# Patient Record
Sex: MALE | Race: WHITE | NOT HISPANIC OR LATINO | Employment: FULL TIME | URBAN - NONMETROPOLITAN AREA
[De-identification: names, ages, dates, MRNs, and addresses within clinical notes are randomized per-mention and may not be internally consistent; named-entity substitution may affect disease eponyms.]

---

## 2018-09-20 ENCOUNTER — OFFICE VISIT (OUTPATIENT)
Dept: UROLOGY | Facility: CLINIC | Age: 53
End: 2018-09-20

## 2018-09-20 VITALS
WEIGHT: 230 LBS | DIASTOLIC BLOOD PRESSURE: 85 MMHG | SYSTOLIC BLOOD PRESSURE: 155 MMHG | BODY MASS INDEX: 31.15 KG/M2 | HEIGHT: 72 IN | TEMPERATURE: 96.7 F

## 2018-09-20 DIAGNOSIS — R97.20 ELEVATED PSA: Primary | ICD-10-CM

## 2018-09-20 LAB
BILIRUB BLD-MCNC: NEGATIVE MG/DL
CLARITY, POC: CLEAR
COLOR UR: YELLOW
GLUCOSE UR STRIP-MCNC: NEGATIVE MG/DL
KETONES UR QL: NEGATIVE
LEUKOCYTE EST, POC: NEGATIVE
NITRITE UR-MCNC: NEGATIVE MG/ML
PH UR: 6 [PH] (ref 5–8)
PROT UR STRIP-MCNC: NEGATIVE MG/DL
RBC # UR STRIP: ABNORMAL /UL
SP GR UR: 1.02 (ref 1–1.03)
UROBILINOGEN UR QL: NORMAL

## 2018-09-20 PROCEDURE — 99204 OFFICE O/P NEW MOD 45 MIN: CPT | Performed by: UROLOGY

## 2018-09-20 PROCEDURE — 81001 URINALYSIS AUTO W/SCOPE: CPT | Performed by: UROLOGY

## 2018-09-20 RX ORDER — CIPROFLOXACIN 500 MG/1
500 TABLET, FILM COATED ORAL 2 TIMES DAILY
Qty: 6 TABLET | Refills: 3 | Status: SHIPPED | OUTPATIENT
Start: 2018-09-20 | End: 2018-10-24

## 2018-09-20 RX ORDER — METOPROLOL TARTRATE 50 MG/1
TABLET, FILM COATED ORAL
Refills: 11 | Status: ON HOLD | COMMUNITY
Start: 2018-09-03 | End: 2018-11-14

## 2018-09-20 NOTE — PROGRESS NOTES
Mr. Hendrix is 53 y.o. male    Chief Complaint   Patient presents with   • Elevated PSA       History of Present Illness  Elevated PSA  Patient is here with an elevated PSA. He has no personal history of prostate cancer. His AUA Symptom Score is 0/35,  . He has no prior genitourinary history of previous  surgery.  Previous PSA values are :   19.7   He reports no urinary symptoms. Patient states symptoms are of no severity. Onset of PSA elevation was February of this year, and has been persistent     The following portions of the patient's history were reviewed and updated as appropriate: allergies, current medications, past family history, past medical history, past social history, past surgical history and problem list.    Review of Systems   Constitutional: Negative for appetite change, chills, fever and unexpected weight change.   HENT: Negative for congestion, ear pain, facial swelling, hearing loss, nosebleeds, trouble swallowing and voice change.    Eyes: Negative for photophobia, pain, discharge and visual disturbance.   Respiratory: Negative for cough, choking, chest tightness and shortness of breath.    Cardiovascular: Negative for chest pain and palpitations.   Gastrointestinal: Negative for abdominal distention, abdominal pain, blood in stool, constipation, diarrhea, nausea and vomiting.   Endocrine: Negative for cold intolerance, heat intolerance and polydipsia.   Genitourinary: Negative for decreased urine volume, difficulty urinating, discharge, dysuria, enuresis, flank pain, frequency, genital sores, hematuria, penile pain, penile swelling, scrotal swelling, testicular pain and urgency.   Musculoskeletal: Negative for arthralgias, joint swelling, neck pain and neck stiffness.   Skin: Negative for pallor and rash.   Allergic/Immunologic: Negative for immunocompromised state.   Neurological: Negative for dizziness, tremors, seizures, syncope, light-headedness and headaches.   Hematological: Negative for  "adenopathy. Does not bruise/bleed easily.   Psychiatric/Behavioral: Negative for agitation, confusion, dysphoric mood, hallucinations, self-injury and suicidal ideas.         Current Outpatient Prescriptions:   •  metoprolol tartrate (LOPRESSOR) 50 MG tablet, TK 1 T PO BID, Disp: , Rfl: 11  •  ciprofloxacin (CIPRO) 500 MG tablet, Take 1 tablet by mouth 2 (Two) Times a Day. Start the day prior to biopsy, Disp: 6 tablet, Rfl: 3    Past Medical History:   Diagnosis Date   • Hypertension        Past Surgical History:   Procedure Laterality Date   • HAND SURGERY     • KNEE SURGERY     • SHOULDER SURGERY         Social History     Social History   • Marital status: Unknown     Social History Main Topics   • Smoking status: Never Smoker   • Smokeless tobacco: Current User     Types: Snuff   • Alcohol use No   • Drug use: No   • Sexual activity: Yes     Other Topics Concern   • Not on file       Family History   Problem Relation Age of Onset   • No Known Problems Father    • No Known Problems Mother        Objective    /85   Temp 96.7 °F (35.9 °C)   Ht 182.9 cm (72\")   Wt 104 kg (230 lb)   BMI 31.19 kg/m²     Physical Exam  Constitutional: Well nourished, Well developed; No apparent distress.  His vital signs are reviewed  Psychiatric: Appropriate affect; Alert and oriented  Eyes: Unremarkable  Musculoskeletal: Normal gait and station  GI: Abdomen is soft, non-tender  Respiratory: No distress; Unlabored movement; No accessory musculature needed with symmetric movements  Skin: No pallor or diaphoresis  ; Penis and testicles are normal; unable to feel his entire prostate due to body habitus.  However the apex of prostate does feel firm        No results found for any previous visit.       Results for orders placed or performed in visit on 09/20/18   POC Urinalysis Dipstick, Multipro   Result Value Ref Range    Color Yellow Yellow, Straw, Dark Yellow, Nikki    Clarity, UA Clear Clear    Glucose, UA Negative " Negative, 1000 mg/dL (3+) mg/dL    Bilirubin Negative Negative    Ketones, UA Negative Negative    Specific Gravity  1.025 1.005 - 1.030    Blood, UA Trace (A) Negative    pH, Urine 6.0 5.0 - 8.0    Protein, POC Negative Negative mg/dL    Urobilinogen, UA Normal Normal    Nitrite, UA Negative Negative    Leukocytes Negative Negative     Assessment and Plan    Bull was seen today for elevated psa.    Diagnoses and all orders for this visit:    Elevated PSA  -     POC Urinalysis Dipstick, Multipro  -     Biopsy Prostate  -     ciprofloxacin (CIPRO) 500 MG tablet; Take 1 tablet by mouth 2 (Two) Times a Day. Start the day prior to biopsy    I reviewed his outside records.  In summary, this is a 53-year-old gentleman with a PSA elevation.  His PSA in February of this year was 17.  He was repeated earlier this month and found the 19.7.    I did discuss with him that this is a gross elevation of his PSA, especially at a young age.  My biggest concern here is possible prostate cancer.  Options would include repeating a PSA versus a prostate biopsy and he has chosen to proceed with a prostate biopsy.  He understands the risk and benefits as outlined below.    I discussed elevated PSA with him today. We discussed that PSA is a protein measured in the bloodstream that comes exclusively from the prostate gland I mentioned to him that all men with a prostate gland will have a certain PSA level. We discussed that this number can be compared to all men and age-specific PSA as well as PSA velocity. We discussed that Prostate Cancer is a possible cause of PSA elevaton, but benign etiologies such as infection, enlargement, aging, and inflammation should also be considered. We discussed that some patients with a normal PSA may also have prostate cancer. The necessity of digital rectal examination is also discussed. The role of free to total PSA Ratio is explained. The risks (infection, bleeding, pain, retention, sepsis) and possible  benefits of transrectal ultrasound with biopsy of the prostate gland is also discussed. It is explained that some patients require a repeat biopsy based on diagnosis of prostate intraepithelial neoplasia or even a continued rising PSA after an initial biopsy. He voiced no additional questions.

## 2018-09-28 ENCOUNTER — PROCEDURE VISIT (OUTPATIENT)
Dept: UROLOGY | Facility: CLINIC | Age: 53
End: 2018-09-28

## 2018-09-28 DIAGNOSIS — R97.20 ELEVATED PSA: Primary | ICD-10-CM

## 2018-09-28 LAB
BILIRUB BLD-MCNC: NEGATIVE MG/DL
CLARITY, POC: CLEAR
COLOR UR: YELLOW
GLUCOSE UR STRIP-MCNC: NEGATIVE MG/DL
KETONES UR QL: NEGATIVE
LEUKOCYTE EST, POC: NEGATIVE
NITRITE UR-MCNC: NEGATIVE MG/ML
PH UR: 6 [PH] (ref 5–8)
PROT UR STRIP-MCNC: NEGATIVE MG/DL
RBC # UR STRIP: ABNORMAL /UL
SP GR UR: 1 (ref 1–1.03)
UROBILINOGEN UR QL: NORMAL

## 2018-09-28 PROCEDURE — 76942 ECHO GUIDE FOR BIOPSY: CPT | Performed by: UROLOGY

## 2018-09-28 PROCEDURE — 76872 US TRANSRECTAL: CPT | Performed by: UROLOGY

## 2018-09-28 PROCEDURE — 55700 PR PROSTATE NEEDLE BIOPSY ANY APPROACH: CPT | Performed by: UROLOGY

## 2018-09-28 PROCEDURE — G0416 PROSTATE BIOPSY, ANY MTHD: HCPCS | Performed by: UROLOGY

## 2018-09-28 PROCEDURE — 81003 URINALYSIS AUTO W/O SCOPE: CPT | Performed by: UROLOGY

## 2018-09-28 NOTE — PROGRESS NOTES
Indications:  Elevated PSA    Pre-operative prep:  fleets enema, oral antibiotic and stopped aspirin, coumadin, and other anticoagulants    Last PSA:  19.7    Procedure:    After proper identification of patient and procedure, patient was placed in the left later decubitus position.  2% lidocaine jelly was instilled per rectum  for topical anesthesia.  The ultrasound probe was gently inserted per rectum. Prostate was scanned from the base of the bladder to the apex.  20 cc of 1% lidocaine plain was then used to perform a prostate nerve block injecting the junction of the seminal vesicle and bladder laterally.      Prostate length: 4.72 cm    Prostate width: 4.58 cm    Prostate height: 2.80 cm    Prostate volume: 31.6 cc    Abnormal findings:  Hypoechoic lesions  and Hyperechoic lesions     Median lobe:  no    A total of 12 biopsies were taken from the base, apex, and mid portion of the gland on both the right and the left sides.     Patient tolerated the procedure well    Complications: none    Estimated blood loss: minimal    Mr. Hendrix was given instructions for follow up.  He will notify the office if he has excessive hematuria, hematochezia, fevers, perineal, or abdominal pain.    Follow up:   He will follow up in 2 weeks for pathology results.

## 2018-10-01 LAB
CYTO UR: NORMAL
LAB AP CASE REPORT: NORMAL
LAB AP INTRADEPARTMENTAL CONSULT: NORMAL
LAB AP SYNOPTIC CHECKLIST: NORMAL
PATH REPORT.FINAL DX SPEC: NORMAL
PATH REPORT.GROSS SPEC: NORMAL

## 2018-10-03 DIAGNOSIS — C61 PROSTATE CANCER (HCC): Primary | ICD-10-CM

## 2018-10-05 ENCOUNTER — TELEPHONE (OUTPATIENT)
Dept: UROLOGY | Facility: CLINIC | Age: 53
End: 2018-10-05

## 2018-10-05 NOTE — TELEPHONE ENCOUNTER
----- Message from Marlene Wood sent at 10/5/2018  2:53 PM CDT -----  Regarding: RE: bone and ct scan  Tried to call pt to transfer him to the [facility]. I left vm with [facility] telephone number and instructions, also to call back if he is unable to call them.  ----- Message -----  From: Elvi Andersen  Sent: 10/5/2018   2:20 PM  To: Marlene Wood  Subject: FW: bone and ct scan                             Please contact patient and transfer him to imaging scheduling. Thank you.  ----- Message -----  From: Stewart Dawkins  Sent: 10/5/2018   2:10 PM  To: Elvi Andersen  Subject: bone and ct scan                                 His ct and bone scan still arnt schd. Cancer consult on Tuesday. They need to be schd on Monday

## 2018-10-08 NOTE — TELEPHONE ENCOUNTER
Called and left message for patient regarding time for CT and Bone Scan tomorrow. Asked him to call back to confirm.

## 2018-10-09 ENCOUNTER — HOSPITAL ENCOUNTER (OUTPATIENT)
Dept: NUCLEAR MEDICINE | Facility: HOSPITAL | Age: 53
Discharge: HOME | End: 2018-10-09
Attending: UROLOGY

## 2018-10-09 ENCOUNTER — HOSPITAL ENCOUNTER (OUTPATIENT)
Dept: CT IMAGING | Facility: HOSPITAL | Age: 53
Discharge: HOME | End: 2018-10-09
Attending: UROLOGY | Admitting: UROLOGY

## 2018-10-09 DIAGNOSIS — C61 PROSTATE CANCER (HCC): ICD-10-CM

## 2018-10-09 LAB — CREAT BLDA-MCNC: 1.2 MG/DL (ref 0.6–1.3)

## 2018-10-09 PROCEDURE — 25010000002 IOPAMIDOL 61 % SOLUTION: Performed by: UROLOGY

## 2018-10-09 PROCEDURE — 0 TECHNETIUM OXIDRONATE KIT: Performed by: UROLOGY

## 2018-10-09 PROCEDURE — A9561 TC99M OXIDRONATE: HCPCS | Performed by: UROLOGY

## 2018-10-09 PROCEDURE — 78306 BONE IMAGING WHOLE BODY: CPT

## 2018-10-09 PROCEDURE — 74177 CT ABD & PELVIS W/CONTRAST: CPT

## 2018-10-09 PROCEDURE — 82565 ASSAY OF CREATININE: CPT

## 2018-10-09 RX ADMIN — TECHNETIUM TC 99M OXIDRONATE 1 DOSE: 3.15 INJECTION, POWDER, LYOPHILIZED, FOR SOLUTION INTRAVENOUS at 12:12

## 2018-10-09 RX ADMIN — IOPAMIDOL 100 ML: 612 INJECTION, SOLUTION INTRAVENOUS at 11:49

## 2018-10-12 ENCOUNTER — OFFICE VISIT (OUTPATIENT)
Dept: UROLOGY | Facility: CLINIC | Age: 53
End: 2018-10-12

## 2018-10-12 DIAGNOSIS — C61 PROSTATE CANCER (HCC): Primary | ICD-10-CM

## 2018-10-12 PROCEDURE — 99215 OFFICE O/P EST HI 40 MIN: CPT | Performed by: UROLOGY

## 2018-10-12 NOTE — PROGRESS NOTES
Today I reviewed his pathology with him.  He does have Gaby 4+4 prostate cancer with 11 of 12 cores positive.  CT scan showed no evidence of metastatic disease.  Bone scan showed minimal uptake in L5 consistent more with degenerative disease then with metastatic disease.    At this point, even with the abnormality seen on the bone scan, I would treat him as a localized prostate cancer.  He was given options for treatment.  I do think that he would most benefit from surgical intervention but did discuss with him that due to the high risk cancer I would likely not do a nerve sparing.  I also discussed with him doing a lymph node dissection along with this.  I did discuss with him that, with high risk prostate cancer, it is not unexpected to have adjuvant radiation therapy if there is adverse pathology.  We discussed risk and benefits of all the treatment options and at this point he is interested in a second opinion.  He is going to make an appointment himself at [facility].  I will make him an appointment to see me in 2 weeks as a placeholder appointment.  If he makes a decision prior to that, either with myself or with the position of [facility], he will call and cancel.    Greater than 40 minutes spent with the patient today face-to-face with greater than 50% spent counseling.    The patient and I had a lengthy discussion regarding prostate cancer. We discussed how prostate cancer is an adenocarcinoma and that his is the most common type. The natural course for most cases of adenocarcinoma of the prostate gland is explained. We considered the severity of the prostate cancer based upon the volume of positive cores and the Gaby score which is explained to the patient. The patient's age and other demographics are considered. Further need for evaluation based upon rectal examination, gaby grade and PSA is reviewed with the patient.. The remainder of the time spent today was regarding current management  "strategies for the patient. Watchful waiting is discussed as close follow up by monitoring symptoms, PSA, and ART to assess progression of prostate cancer. I explained that this is a reasonable option for patients whose life expectancy is less than ten years with low grade disease, which is reasonable since prostate cancer is generally slow to progress.  The role of hormonal therapy including the types available (GnRH agonists,Androgen Receptor blockers,and Orchiectomies) and its use as adjuvant, neoadjuvant, primary, and metastatic management are discussed. It was explained that this should not be considered curative therapy. Radiation Therapy for prostate cancer in  its most traditional forms (XRT & Prostate Interstitial Seed Implantation) is discussed including the length of time required to treat cancer in addition to the adverse effects of radiation on normal tissue is considered. Surgical Removal of the prostate, including laparoscopic, robotic, perineal approach, and the more traditional radical retropubic prostatectomy, was also discussed. I explained briefly the role of cryotherapy and chemotherapy  to the patient but described these therapies to be \"still under study\".  The patient is provided a copy of the [employer]'s information booklet on Prostate Cancer for home study and review. I answered all of the questions to a level that he expresses that he feels informed of his options, in addition to the benefits, risks, and alternatives for definitive management of prostate cancer. I will make a future appointment with him for further discussion    "

## 2018-10-24 ENCOUNTER — OFFICE VISIT (OUTPATIENT)
Dept: UROLOGY | Facility: CLINIC | Age: 53
End: 2018-10-24

## 2018-10-24 ENCOUNTER — RESULTS ENCOUNTER (OUTPATIENT)
Dept: UROLOGY | Facility: CLINIC | Age: 53
End: 2018-10-24

## 2018-10-24 VITALS — WEIGHT: 230 LBS | BODY MASS INDEX: 31.15 KG/M2 | TEMPERATURE: 97.8 F | HEIGHT: 72 IN

## 2018-10-24 DIAGNOSIS — C61 PROSTATE CANCER (HCC): ICD-10-CM

## 2018-10-24 DIAGNOSIS — C61 PROSTATE CANCER (HCC): Primary | ICD-10-CM

## 2018-10-24 LAB
BILIRUB BLD-MCNC: NEGATIVE MG/DL
CLARITY, POC: CLEAR
COLOR UR: YELLOW
GLUCOSE UR STRIP-MCNC: NEGATIVE MG/DL
KETONES UR QL: NEGATIVE
LEUKOCYTE EST, POC: NEGATIVE
NITRITE UR-MCNC: NEGATIVE MG/ML
PH UR: 6 [PH] (ref 5–8)
PROT UR STRIP-MCNC: NEGATIVE MG/DL
RBC # UR STRIP: NEGATIVE /UL
SP GR UR: 1.01 (ref 1–1.03)
UROBILINOGEN UR QL: NORMAL

## 2018-10-24 PROCEDURE — 99214 OFFICE O/P EST MOD 30 MIN: CPT | Performed by: UROLOGY

## 2018-10-24 PROCEDURE — 81001 URINALYSIS AUTO W/SCOPE: CPT | Performed by: UROLOGY

## 2018-10-24 NOTE — PROGRESS NOTES
Mr. Hendrix is 53 y.o. male    Chief Complaint   Patient presents with   • Prostate Cancer       History of Present Illness   He was initially diagnosed with prostate cancer about  2 week(s) ago. This was identified in the context of elevated psa.  Severity of the disease is best described as probable organ confined disease. Previous or current management includes No treatment decision at this time. Associated symptoms include no evidence of irritative or obstructive voiding symptoms, gross hematuria, lower extremity swelling or weight loss. . Currently his PSA is  19.7.     The following portions of the patient's history were reviewed and updated as appropriate: allergies, current medications, past family history, past medical history, past social history, past surgical history and problem list.    Review of Systems   Constitutional: Negative for chills and fever.   Gastrointestinal: Negative for abdominal pain, anal bleeding and blood in stool.   Genitourinary: Negative for decreased urine volume, difficulty urinating, discharge, dysuria, enuresis, flank pain, frequency, genital sores, hematuria, penile pain, penile swelling, scrotal swelling, testicular pain and urgency.         Current Outpatient Prescriptions:   •  metoprolol tartrate (LOPRESSOR) 50 MG tablet, TK 1 T PO BID, Disp: , Rfl: 11    Past Medical History:   Diagnosis Date   • Hypertension    • Prostate CA (CMS/HCC)        Past Surgical History:   Procedure Laterality Date   • HAND SURGERY     • KNEE SURGERY     • SHOULDER SURGERY         Social History     Social History   • Marital status:      Social History Main Topics   • Smoking status: Never Smoker   • Smokeless tobacco: Current User     Types: Snuff   • Alcohol use No   • Drug use: No   • Sexual activity: Yes     Other Topics Concern   • Not on file       Family History   Problem Relation Age of Onset   • No Known Problems Father    • No Known Problems Mother        Objective    Temp 97.8  "°F (36.6 °C)   Ht 182.9 cm (72\")   Wt 104 kg (230 lb)   BMI 31.19 kg/m²     Physical Exam   Constitutional: He is oriented to person, place, and time. He appears well-developed and well-nourished. No distress.   Pulmonary/Chest: Effort normal.   Abdominal: Soft. He exhibits no distension and no mass. There is no tenderness. There is no rebound and no guarding. No hernia.   Neurological: He is alert and oriented to person, place, and time.   Skin: Skin is warm and dry. He is not diaphoretic.   Psychiatric: He has a normal mood and affect.   Vitals reviewed.      Hospital Outpatient Visit on 10/09/2018   Component Date Value Ref Range Status   • Creatinine 10/09/2018 1.20  0.60 - 1.30 mg/dL Final    Serial Number: [id number]:  [id number]       Results for orders placed or performed in visit on 10/24/18   POC Urinalysis Dipstick, Multipro   Result Value Ref Range    Color Yellow Yellow, Straw, Dark Yellow, Nikki    Clarity, UA Clear Clear    Glucose, UA Negative Negative, 1000 mg/dL (3+) mg/dL    Bilirubin Negative Negative    Ketones, UA Negative Negative    Specific Gravity  1.010 1.005 - 1.030    Blood, UA Negative Negative    pH, Urine 6.0 5.0 - 8.0    Protein, POC Negative Negative mg/dL    Urobilinogen, UA Normal Normal    Nitrite, UA Negative Negative    Leukocytes Negative Negative     Assessment and Plan    Bull was seen today for prostate cancer.    Diagnoses and all orders for this visit:    Prostate cancer (CMS/[facility])  -     POC Urinalysis Dipstick, Multipro    Patient had is made the decision to proceed with prostatectomy.  He understands the risk and benefits as outlined below.  As previously discussed, he has a high risk prostate cancer and therefore will need a lymph node dissection.  He understands that I will not be doing a nerve sparing dissection.  In addition, he understands that there is a chance that he may need multimodal treatment with radiation afterwards.      The patient and I had a " lengthy discussion regarding prostate cancer. We discussed how prostate cancer is an adenocarcinoma and that his is the most common type. The natural course for most cases of adenocarcinoma of the prostate gland is explained. We considered the severity of the prostate cancer based upon the volume of positive cores and the Shay score which is explained to the patient. Further need for evaluation based upon rectal examination, Shay grade and PSA is reviewed with the patient The remainder of the time spent today was regarding current management strategies for the patient. Watchful waiting is discussed as close follow up by monitoring symptoms, PSA, and ART to assess progression of prostate cancer. I explained that this is a reasonable option for patients whose life expectancy is less than ten years with low grade disease, which is reasonable since prostate cancer is generally slow to progress.  The role of hormonal therapy including the types available (GnRH agonists,Androgen Receptor blockers,and Orchiectomies) and its use as adjuvant, neoadjuvant, primary, and metastatic management are discussed. It was explained that this should not be considered curative therapy. Radiation Therapy for prostate cancer in  its most traditional forms (XRT & Prostate Interstitial Seed Implantation) is discussed including the length of time required to treat cancer in addition to the adverse effects of radiation on normal tissue is considered. Surgical Removal of the prostate, including laparoscopic, robotic, perineal approach, and the more traditional radical retropubic prostatectomy, was also discussed. The benefits of possible long term cure rate advantages and the possible lack of need for adjuvant therapy were appealing to him. All of these modalities' side effect profile on sexual function, continence, lower urinary tract symptoms, damage to rectal tissues, and recovery time were considered by the patient. He now also  realizes side effects of bladder neck contracture, anejaculation, rectal injury, venous thrombosis, lymphocele, ureteral obstruction, post operative ileus, incontinence,& impotence can & do occur. The patient has opted to have a radical retropubic prostatectomy, but is interested in a less invasive approach through DaVinci robotic technique.I explained the unique complications of trocar injury to bowel, major vessels, CO2 absorption, and that the risk of needing to convert to an open procedure due to adhesions, disease, body habitus, or mechanical failure of the robot. He would like to proceed. All questions were answered

## 2018-10-29 ENCOUNTER — RESULTS ENCOUNTER (OUTPATIENT)
Dept: UROLOGY | Facility: CLINIC | Age: 53
End: 2018-10-29

## 2018-10-29 DIAGNOSIS — C61 PROSTATE CANCER (HCC): ICD-10-CM

## 2018-11-07 ENCOUNTER — APPOINTMENT (OUTPATIENT)
Dept: PREADMISSION TESTING | Facility: HOSPITAL | Age: 53
End: 2018-11-07

## 2018-11-07 VITALS
HEIGHT: 69 IN | OXYGEN SATURATION: 97 % | WEIGHT: 235.89 LBS | SYSTOLIC BLOOD PRESSURE: 138 MMHG | BODY MASS INDEX: 34.94 KG/M2 | DIASTOLIC BLOOD PRESSURE: 79 MMHG | HEART RATE: 72 BPM

## 2018-11-07 LAB
ANION GAP SERPL CALCULATED.3IONS-SCNC: 13 MMOL/L (ref 4–13)
BILIRUB UR QL STRIP: NEGATIVE
BUN BLD-MCNC: 16 MG/DL (ref 5–21)
BUN/CREAT SERPL: 12.9 (ref 7–25)
CALCIUM SPEC-SCNC: 9.4 MG/DL (ref 8.4–10.4)
CHLORIDE SERPL-SCNC: 99 MMOL/L (ref 98–110)
CLARITY UR: CLEAR
CO2 SERPL-SCNC: 29 MMOL/L (ref 24–31)
COLOR UR: ABNORMAL
CREAT BLD-MCNC: 1.24 MG/DL (ref 0.5–1.4)
DEPRECATED RDW RBC AUTO: 41.1 FL (ref 40–54)
ERYTHROCYTE [DISTWIDTH] IN BLOOD BY AUTOMATED COUNT: 12.2 % (ref 12–15)
GFR SERPL CREATININE-BSD FRML MDRD: 61 ML/MIN/1.73
GLUCOSE BLD-MCNC: 96 MG/DL (ref 70–100)
GLUCOSE UR STRIP-MCNC: NEGATIVE MG/DL
HCT VFR BLD AUTO: 47.5 % (ref 40–52)
HGB BLD-MCNC: 16.9 G/DL (ref 14–18)
HGB UR QL STRIP.AUTO: NEGATIVE
KETONES UR QL STRIP: ABNORMAL
LEUKOCYTE ESTERASE UR QL STRIP.AUTO: NEGATIVE
MCH RBC QN AUTO: 32.6 PG (ref 28–32)
MCHC RBC AUTO-ENTMCNC: 35.6 G/DL (ref 33–36)
MCV RBC AUTO: 91.5 FL (ref 82–95)
NITRITE UR QL STRIP: NEGATIVE
PH UR STRIP.AUTO: 6.5 [PH] (ref 5–8)
PLATELET # BLD AUTO: 233 10*3/MM3 (ref 130–400)
PMV BLD AUTO: 10.3 FL (ref 6–12)
POTASSIUM BLD-SCNC: 4 MMOL/L (ref 3.5–5.3)
PROT UR QL STRIP: NEGATIVE
RBC # BLD AUTO: 5.19 10*6/MM3 (ref 4.8–5.9)
SODIUM BLD-SCNC: 141 MMOL/L (ref 135–145)
SP GR UR STRIP: 1.02 (ref 1–1.03)
UROBILINOGEN UR QL STRIP: ABNORMAL
WBC NRBC COR # BLD: 10.31 10*3/MM3 (ref 4.8–10.8)

## 2018-11-07 PROCEDURE — 85027 COMPLETE CBC AUTOMATED: CPT | Performed by: UROLOGY

## 2018-11-07 PROCEDURE — 93005 ELECTROCARDIOGRAM TRACING: CPT

## 2018-11-07 PROCEDURE — 93010 ELECTROCARDIOGRAM REPORT: CPT | Performed by: INTERNAL MEDICINE

## 2018-11-07 PROCEDURE — 80048 BASIC METABOLIC PNL TOTAL CA: CPT | Performed by: UROLOGY

## 2018-11-07 PROCEDURE — 36415 COLL VENOUS BLD VENIPUNCTURE: CPT | Performed by: UROLOGY

## 2018-11-07 PROCEDURE — 81003 URINALYSIS AUTO W/O SCOPE: CPT | Performed by: UROLOGY

## 2018-11-07 NOTE — DISCHARGE INSTRUCTIONS
DAY OF SURGERY INSTRUCTIONS  Radical prostatectomy with lymph node dissection  DR LI        DATE OF SURGERY: NOV 14 2018    ARRIVAL TIME: AS DIRECTED BY OFFICE    DAY OF SURGERY TAKE ONLY THESE MEDICATIONS UNLESS OTHERWISE INSTRUCTED BY YOUR PHYSICIAN: METOPROLOL      MANAGING PAIN AFTER SURGERY    We know you are probably wondering what your pain will be like after surgery.  Following surgery it is unrealistic to expect you will not have pain.   Pain is how our bodies let us know that something is wrong or cautions us to be careful.  That said, our goal is to make your pain tolerable.    Methods we may use to treat your pain include (oral or IV medications, PCAs, epidurals, nerve blocks, etc.)   While some procedures require IV pain medications for a short time after surgery, transitioning to pain medications by mouth allows for better management of pain.   Your nurse will encourage you to take oral pain medications whenever possible.  IV medications work almost immediately, but only last a short while.  Taking medications by mouth allows for a more constant level of medication in your blood stream for a longer period of time.      Once your pain is out of control it is harder to get back under control.  It is important you are aware when your next dose of pain medication is due.  If you are admitted, your nurse may write the time of your next dose on the white board in your room to help you remember.      We are interested in your pain and encourage you to inform us about aggravating factors during your visit.   Many times a simple repositioning every few hours can make a big difference.    If your physician says it is okay, do not let your pain prevent you from getting out of bed. Be sure to call your nurse for assistance prior to getting up so you do not fall.      Before surgery, please decide your tolerable pain goal.  These faces help describe the pain ratings we use on a 0-10 scale.   Be prepared to  tell us your goal and whether or not you take pain or anxiety medications at home.          BEFORE YOU COME TO THE HOSPITAL  (Pre-op instructions)  • Do not eat, drink, smoke or chew gum after midnight the night before surgery.  This also includes no mints.  • Morning of surgery take only the medicines you have been instructed with a sip of water unless otherwise instructed  by your physician.  • Do not shave, wear makeup or dark nail polish.  • Remove all jewelry including rings.  • Leave anything you consider valuable at home.  • Leave your suitcase in the car until after your surgery.  • Bring the following with you if applicable:  o Picture ID and insurance, Medicare or Medicaid cards  o Co-pay/deductible required by insurance (cash, check, credit card)  o Copy of advance directive, living will or power-of- documents if not brought to PAT  o CPAP or BIPAP mask and tubing  o Relaxation aids (MP3 player, book, magazine)  • On the day of surgery check in at registration located at the main entrance of the hospital.       Outpatient Surgery Guidelines, Adult  Outpatient procedures are those for which the person having the procedure is allowed to go home the same day as the procedure. Various procedures are done on an outpatient basis. You should follow some general guidelines if you will be having an outpatient procedure.  LET YOUR HEALTH CARE PROVIDER KNOW ABOUT:  · Any allergies you have.  · All medicines you are taking, including vitamins, herbs, eye drops, creams, and over-the-counter medicines.  · Previous problems you or members of your family have had with the use of anesthetics.  · Any blood disorders you have.  · Previous surgeries you have had.  · Medical conditions you have.  RISKS AND COMPLICATIONS  Your health care provider will discuss possible risks and complications with you before surgery. Common risks and complications include:    · Problems due to the use of anesthetics.  · Blood loss and  replacement (does not apply to minor surgical procedures).  · Temporary increase in pain due to surgery.  · Uncorrected pain or problems that the surgery was meant to correct.  · Infection.  · New damage.  BEFORE THE PROCEDURE  · Ask your health care provider about changing or stopping your regular medicines. You may need to stop taking certain medicines in the days or weeks before the procedure.  · Stop smoking at least 2 weeks before surgery. This lowers your risk for complications during and after surgery. Ask your health care provider for help with this if needed.  · Eat your usual meals and a light supper the day before surgery. Continue fluid intake. Do not drink alcohol.  · Do not eat or drink after midnight the night before your surgery.   · Arrange for someone to take you home and to stay with you for 24 hours after the procedure. Medicine given for your procedure may affect your ability to drive or to care for yourself.  · Call your health care provider's office if you develop an illness or problem that may prevent you from safely having your procedure.  AFTER THE PROCEDURE  After surgery, you will be taken to a recovery area, where your progress will be monitored. If there are no complications, you will be allowed to go home when you are awake, stable, and taking fluids well. You may have numbness around the surgical site. Healing will take some time. You will have tenderness at the surgical site and may have some swelling and bruising. You may also have some nausea.  HOME CARE INSTRUCTIONS  · Do not drive for 24 hours, or as directed by your health care provider. Do not drive while taking prescription pain medicines.  · Do not drink alcohol for 24 hours.  · Do not make important decisions or sign legal documents for 24 hours.  · You may resume a normal diet and activities as directed.  · Do not lift anything heavier than 10 pounds (4.5 kg) or play contact sports until your health care provider says it is  okay.  · Change your bandages (dressings) as directed.  · Only take over-the-counter or prescription medicines as directed by your health care provider.  · Follow up with your health care provider as directed.  SEEK MEDICAL CARE IF:  · You have increased bleeding (more than a small spot) from the surgical site.  · You have redness, swelling, or increasing pain in the wound.  · You see pus coming from the wound.  · You have a fever.  · You notice a bad smell coming from the wound or dressing.  · You feel lightheaded or faint.  · You develop a rash.  · You have trouble breathing.  · You develop allergies.  MAKE SURE YOU:  · Understand these instructions.  · Will watch your condition.  · Will get help right away if you are not doing well or get worse.     This information is not intended to replace advice given to you by your health care provider. Make sure you discuss any questions you have with your health care provider.     Document Released: 09/12/2002 Document Revised: 05/03/2016 Document Reviewed: 05/22/2014  [employer] Interactive Patient Education ©2016 [employer]       Fall Prevention in Hospitals, Adult  As a hospital patient, your condition and the treatments you receive can increase your risk for falls. Some additional risk factors for falls in a hospital include:  · Being in an unfamiliar environment.  · Being on bed rest.  · Your surgery.  · Taking certain medicines.  · Your tubing requirements, such as intravenous (IV) therapy or catheters.  It is important that you learn how to decrease fall risks while at the hospital. Below are important tips that can help prevent falls.  SAFETY TIPS FOR PREVENTING FALLS  Talk about your risk of falling.  · Ask your health care provider why you are at risk for falling. Is it your medicine, illness, tubing placement, or something else?  · Make a plan with your health care provider to keep you safe from falls.  · Ask your health care provider or pharmacist about side  effects of your medicines. Some medicines can make you dizzy or affect your coordination.  Ask for help.  · Ask for help before getting out of bed. You may need to press your call button.  · Ask for assistance in getting safely to the toilet.  · Ask for a walker or cane to be put at your bedside. Ask that most of the side rails on your bed be placed up before your health care provider leaves the room.  · Ask family or friends to sit with you.  · Ask for things that are out of your reach, such as your glasses, hearing aids, telephone, bedside table, or call button.  Follow these tips to avoid falling:  · Stay lying or seated, rather than standing, while waiting for help.  · Wear rubber-soled slippers or shoes whenever you walk in the hospital.  · Avoid quick, sudden movements.  ¨ Change positions slowly.  ¨ Sit on the side of your bed before standing.  ¨ Stand up slowly and wait before you start to walk.  · Let your health care provider know if there is a spill on the floor.  · Pay careful attention to the medical equipment, electrical cords, and tubes around you.  · When you need help, use your call button by your bed or in the bathroom. Wait for one of your health care providers to help you.  · If you feel dizzy or unsure of your footing, return to bed and wait for assistance.  · Avoid being distracted by the TV, telephone, or another person in your room.  · Do not lean or support yourself on rolling objects, such as IV poles or bedside tables.     This information is not intended to replace advice given to you by your health care provider. Make sure you discuss any questions you have with your health care provider.     Document Released: 12/15/2001 Document Revised: 01/08/2016 Document Reviewed: 08/25/2013  [employer] Interactive Patient Education ©2016 [employer]       Surgical Site Infections FAQs  What is a Surgical Site Infection (SSI)?  A surgical site infection is an infection that occurs after surgery in  the part of the body where the surgery took place. Most patients who have surgery do not develop an infection. However, infections develop in about 1 to 3 out of every 100 patients who have surgery.  Some of the common symptoms of a surgical site infection are:  · Redness and pain around the area where you had surgery  · Drainage of cloudy fluid from your surgical wound  · Fever  Can SSIs be treated?  Yes. Most surgical site infections can be treated with antibiotics. The antibiotic given to you depends on the bacteria (germs) causing the infection. Sometimes patients with SSIs also need another surgery to treat the infection.  What are some of the things that hospitals are doing to prevent SSIs?  To prevent SSIs, doctors, nurses, and other healthcare providers:  · Clean their hands and arms up to their elbows with an antiseptic agent just before the surgery.  · Clean their hands with soap and water or an alcohol-based hand rub before and after caring for each patient.  · May remove some of your hair immediately before your surgery using electric clippers if the hair is in the same area where the procedure will occur. They should not shave you with a razor.  · Wear special hair covers, masks, gowns, and gloves during surgery to keep the surgery area clean.  · Give you antibiotics before your surgery starts. In most cases, you should get antibiotics within 60 minutes before the surgery starts and the antibiotics should be stopped within 24 hours after surgery.  · Clean the skin at the site of your surgery with a special soap that kills germs.  What can I do to help prevent SSIs?  Before your surgery:  · Tell your doctor about other medical problems you may have. Health problems such as allergies, diabetes, and obesity could affect your surgery and your treatment.  · Quit smoking. Patients who smoke get more infections. Talk to your doctor about how you can quit before your surgery.  · Do not shave near where you will  have surgery. Shaving with a razor can irritate your skin and make it easier to develop an infection.  At the time of your surgery:  · Speak up if someone tries to shave you with a razor before surgery. Ask why you need to be shaved and talk with your surgeon if you have any concerns.  · Ask if you will get antibiotics before surgery.  After your surgery:  · Make sure that your healthcare providers clean their hands before examining you, either with soap and water or an alcohol-based hand rub.  · If you do not see your providers clean their hands, please ask them to do so.  · Family and friends who visit you should not touch the surgical wound or dressings.  · Family and friends should clean their hands with soap and water or an alcohol-based hand rub before and after visiting you. If you do not see them clean their hands, ask them to clean their hands.  What do I need to do when I go home from the hospital?  · Before you go home, your doctor or nurse should explain everything you need to know about taking care of your wound. Make sure you understand how to care for your wound before you leave the hospital.  · Always clean your hands before and after caring for your wound.  · Before you go home, make sure you know who to contact if you have questions or problems after you get home.  · If you have any symptoms of an infection, such as redness and pain at the surgery site, drainage, or fever, call your doctor immediately.  If you have additional questions, please ask your doctor or nurse.  Developed and co-sponsored by The Society for Healthcare Epidemiology of [place] (SHEA); Infectious Diseases Society of [place] (IDSA); American Hospital Association; Association for Professionals in Infection Control and Epidemiology (APIC); Centers for Disease Control and Prevention (CDC); and The Joint Commission.     This information is not intended to replace advice given to you by your health care provider. Make sure you  discuss any questions you have with your health care provider.     Document Released: 12/23/2014 Document Revised: 01/08/2016 Document Reviewed: 03/02/2016  [employer] Interactive Patient Education ©2016 [employer].       [facility]  CHG 4% Patient Instruction Sheet    Preparing the Skin Before Surgery  Preparing or “prepping” skin before surgery can reduce the risk of infection at the surgical site. To make the process easier,[facility] has chosen 4% Chlorhexidine Gluconate (CHG) antiseptic solution.   The steps below outline the prepping process and should be carefully followed.                                                                                                                                                      Prep the skin at the following time(s):                                                      We recommend you shower the night before surgery, and again the morning of surgery with the 4% CHG antiseptic solution using half of the bottle and a cloth each time.  Dress in clean clothes/sleepwear after showering.  See instructions below for application.          Do not apply any lotions or moisturizers.       Do not shave the area to be prepped for at least 2 days prior to surgery.    Clipping the hair may be done immediately prior to your surgery at the hospital    if needed.    Directions:  Thoroughly rinse your body with water.  Apply 4% CHG to a cloth and wash skin gently, paying special attention to the operative site.  Rinse again thoroughly.  Once you have begun using this product do not apply anything else to your skin. If itching or redness persists, rinse affected areas and discontinue use.    When using this product:  • Keep out of eyes, ears, and mouth.  • If solution should contact these areas, rinse out promptly and thoroughly with water.  • For external use only.  • Do not use in genital area, on your face or head.      PATIENT/FAMILY/RESPONSIBLE PARTY VERBALIZES UNDERSTANDING  OF ABOVE EDUCATION.  COPY OF PAIN SCALE GIVEN AND REVIEWED WITH VERBALIZED UNDERSTANDING.

## 2018-11-13 ENCOUNTER — ANESTHESIA EVENT (OUTPATIENT)
Dept: PERIOP | Facility: HOSPITAL | Age: 53
End: 2018-11-13

## 2018-11-14 ENCOUNTER — ANESTHESIA (OUTPATIENT)
Dept: PERIOP | Facility: HOSPITAL | Age: 53
End: 2018-11-14

## 2018-11-14 ENCOUNTER — HOSPITAL ENCOUNTER (INPATIENT)
Facility: HOSPITAL | Age: 53
LOS: 1 days | Discharge: HOME | End: 2018-11-15
Attending: UROLOGY | Admitting: UROLOGY

## 2018-11-14 DIAGNOSIS — C61 PROSTATE CANCER (HCC): ICD-10-CM

## 2018-11-14 LAB
ABO GROUP BLD: NORMAL
ANION GAP SERPL CALCULATED.3IONS-SCNC: 10 MMOL/L (ref 4–13)
BLD GP AB SCN SERPL QL: NEGATIVE
BUN BLD-MCNC: 12 MG/DL (ref 5–21)
BUN/CREAT SERPL: 9.1 (ref 7–25)
CALCIUM SPEC-SCNC: 8.7 MG/DL (ref 8.4–10.4)
CHLORIDE SERPL-SCNC: 100 MMOL/L (ref 98–110)
CO2 SERPL-SCNC: 29 MMOL/L (ref 24–31)
CREAT BLD-MCNC: 1.32 MG/DL (ref 0.5–1.4)
DEPRECATED RDW RBC AUTO: 42.3 FL (ref 40–54)
ERYTHROCYTE [DISTWIDTH] IN BLOOD BY AUTOMATED COUNT: 12.1 % (ref 12–15)
GFR SERPL CREATININE-BSD FRML MDRD: 57 ML/MIN/1.73
GLUCOSE BLD-MCNC: 159 MG/DL (ref 70–100)
HCT VFR BLD AUTO: 43.8 % (ref 40–52)
HGB BLD-MCNC: 15.6 G/DL (ref 14–18)
MCH RBC QN AUTO: 33.4 PG (ref 28–32)
MCHC RBC AUTO-ENTMCNC: 35.6 G/DL (ref 33–36)
MCV RBC AUTO: 93.8 FL (ref 82–95)
PLATELET # BLD AUTO: 225 10*3/MM3 (ref 130–400)
PMV BLD AUTO: 10.1 FL (ref 6–12)
POTASSIUM BLD-SCNC: 4.8 MMOL/L (ref 3.5–5.3)
RBC # BLD AUTO: 4.67 10*6/MM3 (ref 4.8–5.9)
RH BLD: NEGATIVE
SODIUM BLD-SCNC: 139 MMOL/L (ref 135–145)
T&S EXPIRATION DATE: NORMAL
WBC NRBC COR # BLD: 15.17 10*3/MM3 (ref 4.8–10.8)

## 2018-11-14 PROCEDURE — 38571 LAPAROSCOPY LYMPHADENECTOMY: CPT | Performed by: UROLOGY

## 2018-11-14 PROCEDURE — 94760 N-INVAS EAR/PLS OXIMETRY 1: CPT

## 2018-11-14 PROCEDURE — 25010000002 MIDAZOLAM PER 1 MG: Performed by: NURSE ANESTHETIST, CERTIFIED REGISTERED

## 2018-11-14 PROCEDURE — 88305 TISSUE EXAM BY PATHOLOGIST: CPT | Performed by: UROLOGY

## 2018-11-14 PROCEDURE — 25010000002 KETOROLAC TROMETHAMINE PER 15 MG: Performed by: UROLOGY

## 2018-11-14 PROCEDURE — 25010000002 ONDANSETRON PER 1 MG: Performed by: NURSE ANESTHETIST, CERTIFIED REGISTERED

## 2018-11-14 PROCEDURE — 25010000002 SUCCINYLCHOLINE PER 20 MG: Performed by: NURSE ANESTHETIST, CERTIFIED REGISTERED

## 2018-11-14 PROCEDURE — 0DJD8ZZ INSPECTION OF LOWER INTESTINAL TRACT, VIA NATURAL OR ARTIFICIAL OPENING ENDOSCOPIC: ICD-10-PCS | Performed by: UROLOGY

## 2018-11-14 PROCEDURE — 25010000002 FENTANYL CITRATE (PF) 100 MCG/2ML SOLUTION: Performed by: NURSE ANESTHETIST, CERTIFIED REGISTERED

## 2018-11-14 PROCEDURE — 25010000002 HEPARIN (PORCINE) PER 1000 UNITS: Performed by: UROLOGY

## 2018-11-14 PROCEDURE — 25010000002 CEFAZOLIN PER 500 MG: Performed by: UROLOGY

## 2018-11-14 PROCEDURE — 25010000002 DEXAMETHASONE PER 1 MG: Performed by: NURSE ANESTHETIST, CERTIFIED REGISTERED

## 2018-11-14 PROCEDURE — 55866 LAPS SURG PRST8ECT RPBIC RAD: CPT | Performed by: UROLOGY

## 2018-11-14 PROCEDURE — 0VT04ZZ RESECTION OF PROSTATE, PERCUTANEOUS ENDOSCOPIC APPROACH: ICD-10-PCS | Performed by: UROLOGY

## 2018-11-14 PROCEDURE — 86850 RBC ANTIBODY SCREEN: CPT | Performed by: UROLOGY

## 2018-11-14 PROCEDURE — 94799 UNLISTED PULMONARY SVC/PX: CPT

## 2018-11-14 PROCEDURE — 07TC4ZZ RESECTION OF PELVIS LYMPHATIC, PERCUTANEOUS ENDOSCOPIC APPROACH: ICD-10-PCS | Performed by: UROLOGY

## 2018-11-14 PROCEDURE — 86901 BLOOD TYPING SEROLOGIC RH(D): CPT | Performed by: UROLOGY

## 2018-11-14 PROCEDURE — 85027 COMPLETE CBC AUTOMATED: CPT | Performed by: UROLOGY

## 2018-11-14 PROCEDURE — 25010000003 CEFAZOLIN PER 500 MG: Performed by: UROLOGY

## 2018-11-14 PROCEDURE — 25010000002 PROPOFOL 10 MG/ML EMULSION: Performed by: NURSE ANESTHETIST, CERTIFIED REGISTERED

## 2018-11-14 PROCEDURE — 88309 TISSUE EXAM BY PATHOLOGIST: CPT | Performed by: UROLOGY

## 2018-11-14 PROCEDURE — 25010000002 NEOSTIGMINE PER 0.5 MG: Performed by: NURSE ANESTHETIST, CERTIFIED REGISTERED

## 2018-11-14 PROCEDURE — 86900 BLOOD TYPING SEROLOGIC ABO: CPT | Performed by: UROLOGY

## 2018-11-14 PROCEDURE — 88307 TISSUE EXAM BY PATHOLOGIST: CPT | Performed by: UROLOGY

## 2018-11-14 PROCEDURE — 80048 BASIC METABOLIC PNL TOTAL CA: CPT | Performed by: UROLOGY

## 2018-11-14 PROCEDURE — 8E0W4CZ ROBOTIC ASSISTED PROCEDURE OF TRUNK REGION, PERCUTANEOUS ENDOSCOPIC APPROACH: ICD-10-PCS | Performed by: UROLOGY

## 2018-11-14 PROCEDURE — 0TND4ZZ RELEASE URETHRA, PERCUTANEOUS ENDOSCOPIC APPROACH: ICD-10-PCS | Performed by: UROLOGY

## 2018-11-14 PROCEDURE — 25010000002 MORPHINE PER 10 MG: Performed by: UROLOGY

## 2018-11-14 DEVICE — CLIP LIG HEMOLOK PA LG 6CT PRP: Type: IMPLANTABLE DEVICE | Status: FUNCTIONAL

## 2018-11-14 RX ORDER — BUPIVACAINE HCL/0.9 % NACL/PF 0.1 %
2 PLASTIC BAG, INJECTION (ML) EPIDURAL ONCE
Status: COMPLETED | OUTPATIENT
Start: 2018-11-14 | End: 2018-11-14

## 2018-11-14 RX ORDER — MEPERIDINE HYDROCHLORIDE 25 MG/ML
12.5 INJECTION INTRAMUSCULAR; INTRAVENOUS; SUBCUTANEOUS
Status: DISCONTINUED | OUTPATIENT
Start: 2018-11-14 | End: 2018-11-14 | Stop reason: HOSPADM

## 2018-11-14 RX ORDER — MIDAZOLAM HYDROCHLORIDE 1 MG/ML
1 INJECTION INTRAMUSCULAR; INTRAVENOUS
Status: DISCONTINUED | OUTPATIENT
Start: 2018-11-14 | End: 2018-11-14 | Stop reason: HOSPADM

## 2018-11-14 RX ORDER — FENTANYL CITRATE 50 UG/ML
INJECTION, SOLUTION INTRAMUSCULAR; INTRAVENOUS AS NEEDED
Status: DISCONTINUED | OUTPATIENT
Start: 2018-11-14 | End: 2018-11-14 | Stop reason: SURG

## 2018-11-14 RX ORDER — NALOXONE HCL 0.4 MG/ML
0.4 VIAL (ML) INJECTION
Status: DISCONTINUED | OUTPATIENT
Start: 2018-11-14 | End: 2018-11-15 | Stop reason: HOSPADM

## 2018-11-14 RX ORDER — ONDANSETRON 2 MG/ML
INJECTION INTRAMUSCULAR; INTRAVENOUS AS NEEDED
Status: DISCONTINUED | OUTPATIENT
Start: 2018-11-14 | End: 2018-11-14 | Stop reason: SURG

## 2018-11-14 RX ORDER — SODIUM CHLORIDE 0.9 % (FLUSH) 0.9 %
1-10 SYRINGE (ML) INJECTION AS NEEDED
Status: DISCONTINUED | OUTPATIENT
Start: 2018-11-14 | End: 2018-11-14 | Stop reason: HOSPADM

## 2018-11-14 RX ORDER — NALOXONE HCL 0.4 MG/ML
0.4 VIAL (ML) INJECTION AS NEEDED
Status: DISCONTINUED | OUTPATIENT
Start: 2018-11-14 | End: 2018-11-14 | Stop reason: HOSPADM

## 2018-11-14 RX ORDER — ONDANSETRON 2 MG/ML
4 INJECTION INTRAMUSCULAR; INTRAVENOUS EVERY 6 HOURS PRN
Status: DISCONTINUED | OUTPATIENT
Start: 2018-11-14 | End: 2018-11-15 | Stop reason: HOSPADM

## 2018-11-14 RX ORDER — GLYCOPYRROLATE 0.2 MG/ML
INJECTION INTRAMUSCULAR; INTRAVENOUS AS NEEDED
Status: DISCONTINUED | OUTPATIENT
Start: 2018-11-14 | End: 2018-11-14 | Stop reason: SURG

## 2018-11-14 RX ORDER — OXYCODONE AND ACETAMINOPHEN 10; 325 MG/1; MG/1
1 TABLET ORAL ONCE AS NEEDED
Status: COMPLETED | OUTPATIENT
Start: 2018-11-14 | End: 2018-11-14

## 2018-11-14 RX ORDER — DOCUSATE SODIUM 100 MG/1
100 CAPSULE, LIQUID FILLED ORAL 2 TIMES DAILY PRN
Status: DISCONTINUED | OUTPATIENT
Start: 2018-11-14 | End: 2018-11-15 | Stop reason: HOSPADM

## 2018-11-14 RX ORDER — METOPROLOL TARTRATE 50 MG/1
50 TABLET, FILM COATED ORAL 2 TIMES DAILY
COMMUNITY
End: 2018-11-15 | Stop reason: HOSPADM

## 2018-11-14 RX ORDER — SODIUM CHLORIDE, SODIUM LACTATE, POTASSIUM CHLORIDE, CALCIUM CHLORIDE 600; 310; 30; 20 MG/100ML; MG/100ML; MG/100ML; MG/100ML
100 INJECTION, SOLUTION INTRAVENOUS CONTINUOUS
Status: DISCONTINUED | OUTPATIENT
Start: 2018-11-14 | End: 2018-11-14 | Stop reason: HOSPADM

## 2018-11-14 RX ORDER — FENTANYL CITRATE 50 UG/ML
25 INJECTION, SOLUTION INTRAMUSCULAR; INTRAVENOUS AS NEEDED
Status: DISCONTINUED | OUTPATIENT
Start: 2018-11-14 | End: 2018-11-14 | Stop reason: HOSPADM

## 2018-11-14 RX ORDER — ACETAMINOPHEN 500 MG
1000 TABLET ORAL ONCE
Status: COMPLETED | OUTPATIENT
Start: 2018-11-14 | End: 2018-11-14

## 2018-11-14 RX ORDER — SODIUM CHLORIDE 0.9 % (FLUSH) 0.9 %
3 SYRINGE (ML) INJECTION AS NEEDED
Status: DISCONTINUED | OUTPATIENT
Start: 2018-11-14 | End: 2018-11-14 | Stop reason: HOSPADM

## 2018-11-14 RX ORDER — ONDANSETRON 4 MG/1
4 TABLET, ORALLY DISINTEGRATING ORAL EVERY 6 HOURS PRN
Status: DISCONTINUED | OUTPATIENT
Start: 2018-11-14 | End: 2018-11-15 | Stop reason: HOSPADM

## 2018-11-14 RX ORDER — MAGNESIUM HYDROXIDE 1200 MG/15ML
LIQUID ORAL AS NEEDED
Status: DISCONTINUED | OUTPATIENT
Start: 2018-11-14 | End: 2018-11-14 | Stop reason: HOSPADM

## 2018-11-14 RX ORDER — SUCCINYLCHOLINE CHLORIDE 20 MG/ML
INJECTION INTRAMUSCULAR; INTRAVENOUS AS NEEDED
Status: DISCONTINUED | OUTPATIENT
Start: 2018-11-14 | End: 2018-11-14 | Stop reason: SURG

## 2018-11-14 RX ORDER — KETOROLAC TROMETHAMINE 15 MG/ML
15 INJECTION, SOLUTION INTRAMUSCULAR; INTRAVENOUS EVERY 6 HOURS
Status: COMPLETED | OUTPATIENT
Start: 2018-11-14 | End: 2018-11-15

## 2018-11-14 RX ORDER — SODIUM CHLORIDE 0.9 % (FLUSH) 0.9 %
3 SYRINGE (ML) INJECTION EVERY 12 HOURS SCHEDULED
Status: DISCONTINUED | OUTPATIENT
Start: 2018-11-14 | End: 2018-11-14 | Stop reason: HOSPADM

## 2018-11-14 RX ORDER — PROPOFOL 10 MG/ML
VIAL (ML) INTRAVENOUS AS NEEDED
Status: DISCONTINUED | OUTPATIENT
Start: 2018-11-14 | End: 2018-11-14 | Stop reason: SURG

## 2018-11-14 RX ORDER — OXYCODONE HYDROCHLORIDE AND ACETAMINOPHEN 5; 325 MG/1; MG/1
1 TABLET ORAL EVERY 4 HOURS PRN
Status: DISCONTINUED | OUTPATIENT
Start: 2018-11-14 | End: 2018-11-15 | Stop reason: HOSPADM

## 2018-11-14 RX ORDER — LABETALOL HYDROCHLORIDE 5 MG/ML
5 INJECTION, SOLUTION INTRAVENOUS
Status: DISCONTINUED | OUTPATIENT
Start: 2018-11-14 | End: 2018-11-14 | Stop reason: HOSPADM

## 2018-11-14 RX ORDER — OXYCODONE AND ACETAMINOPHEN 10; 325 MG/1; MG/1
1 TABLET ORAL EVERY 4 HOURS PRN
Status: DISCONTINUED | OUTPATIENT
Start: 2018-11-14 | End: 2018-11-15 | Stop reason: HOSPADM

## 2018-11-14 RX ORDER — DEXAMETHASONE SODIUM PHOSPHATE 4 MG/ML
INJECTION, SOLUTION INTRA-ARTICULAR; INTRALESIONAL; INTRAMUSCULAR; INTRAVENOUS; SOFT TISSUE AS NEEDED
Status: DISCONTINUED | OUTPATIENT
Start: 2018-11-14 | End: 2018-11-14 | Stop reason: SURG

## 2018-11-14 RX ORDER — MIDAZOLAM HYDROCHLORIDE 1 MG/ML
2 INJECTION INTRAMUSCULAR; INTRAVENOUS
Status: DISCONTINUED | OUTPATIENT
Start: 2018-11-14 | End: 2018-11-14 | Stop reason: HOSPADM

## 2018-11-14 RX ORDER — SODIUM CHLORIDE 9 MG/ML
125 INJECTION, SOLUTION INTRAVENOUS CONTINUOUS
Status: DISCONTINUED | OUTPATIENT
Start: 2018-11-14 | End: 2018-11-15 | Stop reason: HOSPADM

## 2018-11-14 RX ORDER — METOPROLOL TARTRATE 50 MG/1
50 TABLET, FILM COATED ORAL EVERY 12 HOURS SCHEDULED
Status: DISCONTINUED | OUTPATIENT
Start: 2018-11-14 | End: 2018-11-15 | Stop reason: HOSPADM

## 2018-11-14 RX ORDER — SUFENTANIL CITRATE 50 UG/ML
INJECTION EPIDURAL; INTRAVENOUS AS NEEDED
Status: DISCONTINUED | OUTPATIENT
Start: 2018-11-14 | End: 2018-11-14 | Stop reason: SURG

## 2018-11-14 RX ORDER — LIDOCAINE HYDROCHLORIDE 40 MG/ML
SOLUTION TOPICAL AS NEEDED
Status: DISCONTINUED | OUTPATIENT
Start: 2018-11-14 | End: 2018-11-14 | Stop reason: SURG

## 2018-11-14 RX ORDER — SODIUM CHLORIDE, SODIUM LACTATE, POTASSIUM CHLORIDE, CALCIUM CHLORIDE 600; 310; 30; 20 MG/100ML; MG/100ML; MG/100ML; MG/100ML
30 INJECTION, SOLUTION INTRAVENOUS CONTINUOUS
Status: DISCONTINUED | OUTPATIENT
Start: 2018-11-14 | End: 2018-11-14 | Stop reason: HOSPADM

## 2018-11-14 RX ORDER — SODIUM CHLORIDE 0.9 % (FLUSH) 0.9 %
3-10 SYRINGE (ML) INJECTION AS NEEDED
Status: DISCONTINUED | OUTPATIENT
Start: 2018-11-14 | End: 2018-11-14 | Stop reason: HOSPADM

## 2018-11-14 RX ORDER — MIDAZOLAM HYDROCHLORIDE 1 MG/ML
INJECTION INTRAMUSCULAR; INTRAVENOUS AS NEEDED
Status: DISCONTINUED | OUTPATIENT
Start: 2018-11-14 | End: 2018-11-14 | Stop reason: SURG

## 2018-11-14 RX ORDER — SODIUM CHLORIDE, SODIUM LACTATE, POTASSIUM CHLORIDE, CALCIUM CHLORIDE 600; 310; 30; 20 MG/100ML; MG/100ML; MG/100ML; MG/100ML
1000 INJECTION, SOLUTION INTRAVENOUS CONTINUOUS
Status: DISCONTINUED | OUTPATIENT
Start: 2018-11-14 | End: 2018-11-14 | Stop reason: HOSPADM

## 2018-11-14 RX ORDER — OXYCODONE AND ACETAMINOPHEN 7.5; 325 MG/1; MG/1
2 TABLET ORAL ONCE AS NEEDED
Status: DISCONTINUED | OUTPATIENT
Start: 2018-11-14 | End: 2018-11-14 | Stop reason: HOSPADM

## 2018-11-14 RX ORDER — ONDANSETRON 4 MG/1
4 TABLET, FILM COATED ORAL EVERY 6 HOURS PRN
Status: DISCONTINUED | OUTPATIENT
Start: 2018-11-14 | End: 2018-11-15 | Stop reason: HOSPADM

## 2018-11-14 RX ORDER — CEFAZOLIN SODIUM IN 0.9 % NACL 3 G/100 ML
3 INTRAVENOUS SOLUTION, PIGGYBACK (ML) INTRAVENOUS EVERY 8 HOURS
Status: COMPLETED | OUTPATIENT
Start: 2018-11-14 | End: 2018-11-15

## 2018-11-14 RX ORDER — ROCURONIUM BROMIDE 10 MG/ML
INJECTION, SOLUTION INTRAVENOUS AS NEEDED
Status: DISCONTINUED | OUTPATIENT
Start: 2018-11-14 | End: 2018-11-14 | Stop reason: SURG

## 2018-11-14 RX ORDER — METOCLOPRAMIDE HYDROCHLORIDE 5 MG/ML
5 INJECTION INTRAMUSCULAR; INTRAVENOUS
Status: DISCONTINUED | OUTPATIENT
Start: 2018-11-14 | End: 2018-11-14 | Stop reason: HOSPADM

## 2018-11-14 RX ORDER — HEPARIN SODIUM 5000 [USP'U]/ML
5000 INJECTION, SOLUTION INTRAVENOUS; SUBCUTANEOUS ONCE
Status: COMPLETED | OUTPATIENT
Start: 2018-11-14 | End: 2018-11-14

## 2018-11-14 RX ORDER — ONDANSETRON 2 MG/ML
4 INJECTION INTRAMUSCULAR; INTRAVENOUS ONCE AS NEEDED
Status: DISCONTINUED | OUTPATIENT
Start: 2018-11-14 | End: 2018-11-14 | Stop reason: HOSPADM

## 2018-11-14 RX ORDER — LIDOCAINE HYDROCHLORIDE 20 MG/ML
INJECTION, SOLUTION INFILTRATION; PERINEURAL AS NEEDED
Status: DISCONTINUED | OUTPATIENT
Start: 2018-11-14 | End: 2018-11-14 | Stop reason: SURG

## 2018-11-14 RX ORDER — IPRATROPIUM BROMIDE AND ALBUTEROL SULFATE 2.5; .5 MG/3ML; MG/3ML
3 SOLUTION RESPIRATORY (INHALATION) ONCE AS NEEDED
Status: DISCONTINUED | OUTPATIENT
Start: 2018-11-14 | End: 2018-11-14 | Stop reason: HOSPADM

## 2018-11-14 RX ORDER — HEPARIN SODIUM 5000 [USP'U]/ML
5000 INJECTION, SOLUTION INTRAVENOUS; SUBCUTANEOUS EVERY 8 HOURS SCHEDULED
Status: DISCONTINUED | OUTPATIENT
Start: 2018-11-14 | End: 2018-11-15 | Stop reason: HOSPADM

## 2018-11-14 RX ORDER — IBUPROFEN 600 MG/1
600 TABLET ORAL ONCE AS NEEDED
Status: DISCONTINUED | OUTPATIENT
Start: 2018-11-14 | End: 2018-11-14 | Stop reason: HOSPADM

## 2018-11-14 RX ORDER — PHENYLEPHRINE HCL IN 0.9% NACL 0.8MG/10ML
SYRINGE (ML) INTRAVENOUS AS NEEDED
Status: DISCONTINUED | OUTPATIENT
Start: 2018-11-14 | End: 2018-11-14 | Stop reason: SURG

## 2018-11-14 RX ADMIN — EPHEDRINE SULFATE 10 MG: 50 INJECTION INTRAMUSCULAR; INTRAVENOUS; SUBCUTANEOUS at 07:25

## 2018-11-14 RX ADMIN — LIDOCAINE HYDROCHLORIDE 100 MG: 20 INJECTION, SOLUTION INFILTRATION; PERINEURAL at 07:16

## 2018-11-14 RX ADMIN — SUFENTANIL CITRATE 25 MCG: 50 INJECTION, SOLUTION EPIDURAL; INTRAVENOUS at 07:16

## 2018-11-14 RX ADMIN — Medication 4 MG: at 10:57

## 2018-11-14 RX ADMIN — EPHEDRINE SULFATE 10 MG: 50 INJECTION INTRAMUSCULAR; INTRAVENOUS; SUBCUTANEOUS at 08:27

## 2018-11-14 RX ADMIN — GLYCOPYRROLATE 0.2 MG: 0.2 INJECTION, SOLUTION INTRAMUSCULAR; INTRAVENOUS at 11:02

## 2018-11-14 RX ADMIN — ROCURONIUM BROMIDE 40 MG: 10 INJECTION INTRAVENOUS at 07:20

## 2018-11-14 RX ADMIN — SUCCINYLCHOLINE CHLORIDE 160 MG: 20 INJECTION, SOLUTION INTRAMUSCULAR; INTRAVENOUS at 07:17

## 2018-11-14 RX ADMIN — MIDAZOLAM HYDROCHLORIDE 3 MG: 1 INJECTION, SOLUTION INTRAMUSCULAR; INTRAVENOUS at 07:16

## 2018-11-14 RX ADMIN — EPHEDRINE SULFATE 10 MG: 50 INJECTION INTRAMUSCULAR; INTRAVENOUS; SUBCUTANEOUS at 07:29

## 2018-11-14 RX ADMIN — KETOROLAC TROMETHAMINE 15 MG: 15 INJECTION, SOLUTION INTRAMUSCULAR; INTRAVENOUS at 15:35

## 2018-11-14 RX ADMIN — SODIUM CHLORIDE 3 G: 9 INJECTION, SOLUTION INTRAVENOUS at 23:54

## 2018-11-14 RX ADMIN — ACETAMINOPHEN 1000 MG: 500 TABLET ORAL at 06:34

## 2018-11-14 RX ADMIN — Medication 80 MCG: at 11:07

## 2018-11-14 RX ADMIN — FENTANYL CITRATE 100 MCG: 50 INJECTION, SOLUTION INTRAMUSCULAR; INTRAVENOUS at 10:46

## 2018-11-14 RX ADMIN — SODIUM CHLORIDE, POTASSIUM CHLORIDE, SODIUM LACTATE AND CALCIUM CHLORIDE 100 ML/HR: 600; 310; 30; 20 INJECTION, SOLUTION INTRAVENOUS at 12:24

## 2018-11-14 RX ADMIN — Medication 2 G: at 07:21

## 2018-11-14 RX ADMIN — OXYCODONE HYDROCHLORIDE AND ACETAMINOPHEN 1 TABLET: 10; 325 TABLET ORAL at 21:41

## 2018-11-14 RX ADMIN — MIDAZOLAM HYDROCHLORIDE 2 MG: 1 INJECTION, SOLUTION INTRAMUSCULAR; INTRAVENOUS at 06:59

## 2018-11-14 RX ADMIN — ROCURONIUM BROMIDE 30 MG: 10 INJECTION INTRAVENOUS at 09:18

## 2018-11-14 RX ADMIN — SODIUM CHLORIDE 3 G: 9 INJECTION, SOLUTION INTRAVENOUS at 16:22

## 2018-11-14 RX ADMIN — SODIUM CHLORIDE, POTASSIUM CHLORIDE, SODIUM LACTATE AND CALCIUM CHLORIDE 30 ML/HR: 600; 310; 30; 20 INJECTION, SOLUTION INTRAVENOUS at 06:01

## 2018-11-14 RX ADMIN — KETOROLAC TROMETHAMINE 15 MG: 15 INJECTION, SOLUTION INTRAMUSCULAR; INTRAVENOUS at 22:29

## 2018-11-14 RX ADMIN — HEPARIN SODIUM 5000 UNITS: 5000 INJECTION, SOLUTION INTRAVENOUS; SUBCUTANEOUS at 07:07

## 2018-11-14 RX ADMIN — ROCURONIUM BROMIDE 20 MG: 10 INJECTION INTRAVENOUS at 07:41

## 2018-11-14 RX ADMIN — HEPARIN SODIUM 5000 UNITS: 5000 INJECTION, SOLUTION INTRAVENOUS; SUBCUTANEOUS at 22:29

## 2018-11-14 RX ADMIN — ROCURONIUM BROMIDE 10 MG: 10 INJECTION INTRAVENOUS at 07:16

## 2018-11-14 RX ADMIN — FENTANYL CITRATE 25 MCG: 50 INJECTION INTRAMUSCULAR; INTRAVENOUS at 12:26

## 2018-11-14 RX ADMIN — SUFENTANIL CITRATE 25 MCG: 50 INJECTION, SOLUTION EPIDURAL; INTRAVENOUS at 07:47

## 2018-11-14 RX ADMIN — HEPARIN SODIUM 5000 UNITS: 5000 INJECTION, SOLUTION INTRAVENOUS; SUBCUTANEOUS at 15:35

## 2018-11-14 RX ADMIN — SODIUM CHLORIDE 125 ML/HR: 9 INJECTION, SOLUTION INTRAVENOUS at 19:12

## 2018-11-14 RX ADMIN — GLYCOPYRROLATE 0.6 MG: 0.2 INJECTION, SOLUTION INTRAMUSCULAR; INTRAVENOUS at 10:57

## 2018-11-14 RX ADMIN — OXYCODONE HYDROCHLORIDE AND ACETAMINOPHEN 1 TABLET: 10; 325 TABLET ORAL at 13:04

## 2018-11-14 RX ADMIN — LIDOCAINE HYDROCHLORIDE 0.5 ML: 10 INJECTION, SOLUTION EPIDURAL; INFILTRATION; INTRACAUDAL; PERINEURAL at 05:58

## 2018-11-14 RX ADMIN — FENTANYL CITRATE 25 MCG: 50 INJECTION INTRAMUSCULAR; INTRAVENOUS at 12:30

## 2018-11-14 RX ADMIN — ONDANSETRON HYDROCHLORIDE 4 MG: 2 SOLUTION INTRAMUSCULAR; INTRAVENOUS at 10:48

## 2018-11-14 RX ADMIN — MORPHINE SULFATE 3 MG: 4 INJECTION INTRAVENOUS at 15:34

## 2018-11-14 RX ADMIN — DEXAMETHASONE SODIUM PHOSPHATE 8 MG: 4 INJECTION, SOLUTION INTRAMUSCULAR; INTRAVENOUS at 07:40

## 2018-11-14 RX ADMIN — FENTANYL CITRATE 25 MCG: 50 INJECTION INTRAMUSCULAR; INTRAVENOUS at 12:22

## 2018-11-14 RX ADMIN — PROPOFOL 150 MG: 10 INJECTION, EMULSION INTRAVENOUS at 07:16

## 2018-11-14 RX ADMIN — LIDOCAINE HYDROCHLORIDE 1 EACH: 40 SOLUTION TOPICAL at 07:18

## 2018-11-14 RX ADMIN — SODIUM CHLORIDE, POTASSIUM CHLORIDE, SODIUM LACTATE AND CALCIUM CHLORIDE 1000 ML: 600; 310; 30; 20 INJECTION, SOLUTION INTRAVENOUS at 06:03

## 2018-11-14 NOTE — ANESTHESIA PREPROCEDURE EVALUATION
Anesthesia Evaluation     Patient summary reviewed and Nursing notes reviewed   NPO Solid Status: > 8 hours  NPO Liquid Status: > 4 hours           Airway   Mallampati: II  TM distance: <3 FB  Neck ROM: limited  Dental          Pulmonary - negative pulmonary ROS and normal exam   Cardiovascular - normal exam  Exercise tolerance: good (4-7 METS)    ECG reviewed  Patient on routine beta blocker and Beta blocker given within 24 hours of surgery    (+) hypertension,       Neuro/Psych- negative ROS  GI/Hepatic/Renal/Endo    (-) GERD, liver disease, no renal disease    Musculoskeletal (-) negative ROS    Abdominal  - normal exam   Substance History - negative use     OB/GYN negative ob/gyn ROS         Other      history of cancer                    Anesthesia Plan    ASA 2     general     intravenous induction   Anesthetic plan, all risks, benefits, and alternatives have been provided, discussed and informed consent has been obtained with: patient.

## 2018-11-14 NOTE — ANESTHESIA POSTPROCEDURE EVALUATION
"Patient: Caden Hendrix    Procedure Summary     Date:  11/14/18 Room / Location:   [facility] OR 14 /  [facility]    Anesthesia Start:  0712 Anesthesia Stop:  1132    Procedure:  PROSTATECTOMY LAPAROSCOPIC WITH DAVINCI SI ROBOT, BILATERAL PELVIC LYMPH NODE DISSECTION (N/A Abdomen) Diagnosis:       Prostate cancer (CMS/HCC)      (Prostate cancer (CMS/HCC) [C61])    Surgeon:  Manfred Vargas MD Provider:  Andre Tiwari CRNA    Anesthesia Type:  general ASA Status:  2          Anesthesia Type: general  Last vitals  BP   104/72 (11/14/18 1458)   Temp   98.3 °F (36.8 °C) (11/14/18 1458)   Pulse   80 (11/14/18 1458)   Resp   16 (11/14/18 1458)     SpO2   96 % (11/14/18 1534)     Post Anesthesia Care and Evaluation    PONV Status: none  Comments: Patient d/c from PACU prior to anes eval based on Marjorie score.  Please see RN notes for details of d/c criteria.    Blood pressure 104/72, pulse 80, temperature 98.3 °F (36.8 °C), resp. rate 16, height 175.5 cm (69.09\"), weight 107 kg (235 lb 14.3 oz), SpO2 96 %.          "

## 2018-11-14 NOTE — ANESTHESIA PROCEDURE NOTES
ANESTHESIA INTUBATION  Urgency: elective    Airway not difficult    General Information and Staff    Patient location during procedure: OR  CRNA: Andre Tiwari CRNA    Indications and Patient Condition  Indications for airway management: airway protection    Preoxygenated: yes  MILS maintained throughout  Mask difficulty assessment: 2 - vent by mask + OA or adjuvant +/- NMBA    Final Airway Details  Final airway type: endotracheal airway      Successful airway: ETT  Cuffed: yes   Successful intubation technique: direct laryngoscopy  Endotracheal tube insertion site: oral  Blade: Marie  Blade size: 2  ETT size (mm): 7.5  Cormack-Lehane Classification: grade I - full view of glottis  Placement verified by: chest auscultation and capnometry   Inital cuff pressure (cm H2O): 8  Cuff volume (mL): 8  Measured from: lips  ETT to lips (cm): 22  Number of attempts at approach: 1

## 2018-11-15 VITALS
WEIGHT: 235.89 LBS | RESPIRATION RATE: 16 BRPM | TEMPERATURE: 97.6 F | OXYGEN SATURATION: 94 % | SYSTOLIC BLOOD PRESSURE: 116 MMHG | HEIGHT: 69 IN | DIASTOLIC BLOOD PRESSURE: 64 MMHG | HEART RATE: 80 BPM | BODY MASS INDEX: 34.94 KG/M2

## 2018-11-15 LAB
ANION GAP SERPL CALCULATED.3IONS-SCNC: 8 MMOL/L (ref 4–13)
BUN BLD-MCNC: 14 MG/DL (ref 5–21)
BUN/CREAT SERPL: 12.4 (ref 7–25)
CALCIUM SPEC-SCNC: 8 MG/DL (ref 8.4–10.4)
CHLORIDE SERPL-SCNC: 101 MMOL/L (ref 98–110)
CO2 SERPL-SCNC: 29 MMOL/L (ref 24–31)
CREAT BLD-MCNC: 1.13 MG/DL (ref 0.5–1.4)
CREAT FLD-MCNC: 1.1 MG/DL
DEPRECATED RDW RBC AUTO: 42.5 FL (ref 40–54)
ERYTHROCYTE [DISTWIDTH] IN BLOOD BY AUTOMATED COUNT: 12.3 % (ref 12–15)
GFR SERPL CREATININE-BSD FRML MDRD: 68 ML/MIN/1.73
GLUCOSE BLD-MCNC: 111 MG/DL (ref 70–100)
HCT VFR BLD AUTO: 35.8 % (ref 40–52)
HCT VFR BLD AUTO: 38.3 % (ref 40–52)
HGB BLD-MCNC: 12.6 G/DL (ref 14–18)
HGB BLD-MCNC: 13.5 G/DL (ref 14–18)
MCH RBC QN AUTO: 33.2 PG (ref 28–32)
MCHC RBC AUTO-ENTMCNC: 35.2 G/DL (ref 33–36)
MCV RBC AUTO: 94.5 FL (ref 82–95)
PLATELET # BLD AUTO: 183 10*3/MM3 (ref 130–400)
PMV BLD AUTO: 10.1 FL (ref 6–12)
POTASSIUM BLD-SCNC: 4.4 MMOL/L (ref 3.5–5.3)
RBC # BLD AUTO: 3.79 10*6/MM3 (ref 4.8–5.9)
SODIUM BLD-SCNC: 138 MMOL/L (ref 135–145)
WBC NRBC COR # BLD: 10.33 10*3/MM3 (ref 4.8–10.8)

## 2018-11-15 PROCEDURE — 85014 HEMATOCRIT: CPT | Performed by: UROLOGY

## 2018-11-15 PROCEDURE — 25010000002 HEPARIN (PORCINE) PER 1000 UNITS: Performed by: UROLOGY

## 2018-11-15 PROCEDURE — 82570 ASSAY OF URINE CREATININE: CPT | Performed by: UROLOGY

## 2018-11-15 PROCEDURE — 99024 POSTOP FOLLOW-UP VISIT: CPT | Performed by: UROLOGY

## 2018-11-15 PROCEDURE — 85018 HEMOGLOBIN: CPT | Performed by: UROLOGY

## 2018-11-15 PROCEDURE — 80048 BASIC METABOLIC PNL TOTAL CA: CPT | Performed by: UROLOGY

## 2018-11-15 PROCEDURE — 85027 COMPLETE CBC AUTOMATED: CPT | Performed by: UROLOGY

## 2018-11-15 PROCEDURE — 25010000002 KETOROLAC TROMETHAMINE PER 15 MG: Performed by: UROLOGY

## 2018-11-15 RX ORDER — OXYCODONE HYDROCHLORIDE AND ACETAMINOPHEN 5; 325 MG/1; MG/1
1 TABLET ORAL EVERY 4 HOURS PRN
Qty: 18 TABLET | Refills: 0 | Status: SHIPPED | OUTPATIENT
Start: 2018-11-15 | End: 2018-11-18

## 2018-11-15 RX ADMIN — SODIUM CHLORIDE 125 ML/HR: 9 INJECTION, SOLUTION INTRAVENOUS at 03:39

## 2018-11-15 RX ADMIN — KETOROLAC TROMETHAMINE 15 MG: 15 INJECTION, SOLUTION INTRAMUSCULAR; INTRAVENOUS at 04:18

## 2018-11-15 RX ADMIN — HEPARIN SODIUM 5000 UNITS: 5000 INJECTION, SOLUTION INTRAVENOUS; SUBCUTANEOUS at 05:29

## 2018-11-15 RX ADMIN — KETOROLAC TROMETHAMINE 15 MG: 15 INJECTION, SOLUTION INTRAMUSCULAR; INTRAVENOUS at 10:49

## 2018-11-15 RX ADMIN — OXYCODONE HYDROCHLORIDE AND ACETAMINOPHEN 1 TABLET: 10; 325 TABLET ORAL at 12:32

## 2018-11-15 RX ADMIN — OXYCODONE HYDROCHLORIDE AND ACETAMINOPHEN 1 TABLET: 10; 325 TABLET ORAL at 05:44

## 2018-11-19 LAB
CYTO UR: NORMAL
LAB AP CASE REPORT: NORMAL
LAB AP SYNOPTIC CHECKLIST: NORMAL
PATH REPORT.FINAL DX SPEC: NORMAL
PATH REPORT.GROSS SPEC: NORMAL

## 2018-11-20 ENCOUNTER — OFFICE VISIT (OUTPATIENT)
Dept: UROLOGY | Facility: CLINIC | Age: 53
End: 2018-11-20

## 2018-11-20 ENCOUNTER — HOSPITAL ENCOUNTER (OUTPATIENT)
Dept: GENERAL RADIOLOGY | Facility: HOSPITAL | Age: 53
Discharge: HOME | End: 2018-11-20
Attending: UROLOGY | Admitting: UROLOGY

## 2018-11-20 VITALS
TEMPERATURE: 98.1 F | WEIGHT: 230.4 LBS | HEIGHT: 72 IN | SYSTOLIC BLOOD PRESSURE: 132 MMHG | DIASTOLIC BLOOD PRESSURE: 78 MMHG | BODY MASS INDEX: 31.21 KG/M2

## 2018-11-20 DIAGNOSIS — C61 PROSTATE CANCER (HCC): ICD-10-CM

## 2018-11-20 DIAGNOSIS — C61 PROSTATE CANCER (HCC): Primary | ICD-10-CM

## 2018-11-20 PROCEDURE — 0 DIATRIZOATE MEGLUMINE 18 % SOLUTION: Performed by: UROLOGY

## 2018-11-20 PROCEDURE — 74430 CONTRAST X-RAY BLADDER: CPT

## 2018-11-20 PROCEDURE — 99024 POSTOP FOLLOW-UP VISIT: CPT | Performed by: UROLOGY

## 2018-11-20 RX ADMIN — DIATRIZOATE MEGLUMINE 250 ML: 180 INJECTION, SOLUTION INTRAVESICAL at 07:56

## 2018-11-20 NOTE — PROGRESS NOTES
"Mr. Hendrix is 53 y.o. male    Chief Complaint   Patient presents with   • Prostate Cancer       History of Present Illness  Status post prostatectomy.  Patient is doing well.  No nausea vomiting.  Pain is well-controlled.  The following portions of the patient's history were reviewed and updated as appropriate: allergies, current medications, past family history, past medical history, past social history, past surgical history and problem list.    Review of Systems   Constitutional: Negative for chills and fever.   Gastrointestinal: Negative for abdominal pain, anal bleeding and blood in stool.   Genitourinary: Negative for dysuria and hematuria.       No current outpatient medications on file.  No current facility-administered medications for this visit.     Past Medical History:   Diagnosis Date   • Hypertension    • Prostate CA (CMS/HCC)        Past Surgical History:   Procedure Laterality Date   • HAND SURGERY     • KNEE SURGERY     • SHOULDER SURGERY         Social History     Socioeconomic History   • Marital status:      Spouse name: Not on file   • Number of children: Not on file   • Years of education: Not on file   • Highest education level: Not on file   Tobacco Use   • Smoking status: Never Smoker   • Smokeless tobacco: Current User     Types: Snuff   • Tobacco comment: 1 CAN EVERY 2 DAYS   Substance and Sexual Activity   • Alcohol use: No   • Drug use: No   • Sexual activity: Defer       Family History   Problem Relation Age of Onset   • No Known Problems Father    • No Known Problems Mother        Objective    /78   Temp 98.1 °F (36.7 °C)   Ht 182.9 cm (72\")   Wt 105 kg (230 lb 6.4 oz)   BMI 31.25 kg/m²     Physical Exam  Incisions clean dry and intact.  No erythema.  Abdomen soft nondistended, appropriately tender to palpation  Admission on 11/14/2018, Discharged on 11/15/2018   Component Date Value Ref Range Status   • ABO Type 11/14/2018 A   Final   • RH type 11/14/2018 Negative   " Final   • Antibody Screen 11/14/2018 Negative   Final   • T&S Expiration Date 11/14/2018 11/17/2018 11:59:59 PM   Final   • Case Report 11/14/2018    Final                    Value:Surgical Pathology Report                         Case: [id number]                                  Authorizing Provider:  Manfred Vargas MD    Collected:           11/14/2018 10:02 AM          Ordering Location:     [facility] OR  Received:            11/15/2018 04:50 AM          Pathologist:           Nelda Vela MD                                                          Specimens:   1) - Prostate, PROSTATE                                                                             2) - Pelvis, Right pelvic lymph nodes                                                               3) - Pelvis, Left pelvic lymph nodes                                                      • Final Diagnosis 11/14/2018    Final                    Value:This result contains rich text formatting which cannot be displayed here.   • Synoptic Checklist 11/14/2018    Final                    Value:PROSTATE GLAND: Radical Prostatectomy  (Prostate Res - All Specimens)                                                                                    SPECIMEN                               Procedure:    Radical prostatectomy                                Prostate Size:                                     Prostate Weight (g):    36 g                                 Prostate Greatest Dimension in Centimeters (cm):    5.3 Centimeters (cm)                                                        TUMOR                               Histologic Type:    Acinar adenocarcinoma                                Histologic Grade:                                     Shay Pattern:                                       Primary Shay Pattern:    Pattern 4                                    Secondary Rheems Pattern:    Pattern 4                                     Tertiary Shay Pattern:    Pattern 3                                    Total Shay Score:    8                                    Grade Group:    4                                Tumor Extent:                                     Tumor Quantitation:    Estimated percentage of prostate involved by tumor: 28 %                                 Extraprostatic Extension (EPE):    Not identified                                  Urinary Bladder Neck Invasion:    Not identified                                  Seminal Vesicle Invasion:    Not identified                                Accessory Findings:                                     Treatment Effect:    No known presurgical therapy                                                         MARGINS                               Margins:    Uninvolved by invasive carcinoma                                  :    Benign prostate glands present at surgical margin                                                         LYMPH NODES                               Number of Lymph Nodes Involved:    0                                Number of Lymph Nodes Examined:    2                                                         PATHOLOGIC STAGE CLASSIFICATION (pTNM, AJCC 8th Edition)                               TNM Descriptors:    Not applicable                                Primary Tumor (pT):    pT2                                Regional Lymph Nodes (pN):    pN0                                Distant Metastasis (pM):    Not applicable - pM cannot be determined from the submitted specimen(s)      • Gross Description 11/14/2018    Final                    Value:This result contains rich text formatting which cannot be displayed here.   • Microscopic Description 11/14/2018    Final                    Value:This result contains rich text formatting which cannot be displayed here.   • WBC 11/14/2018 15.17* 4.80 - 10.80 10*3/mm3 Final   • RBC 11/14/2018 4.67* 4.80 - 5.90 10*6/mm3  Final   • Hemoglobin 11/14/2018 15.6  14.0 - 18.0 g/dL Final   • Hematocrit 11/14/2018 43.8  40.0 - 52.0 % Final   • MCV 11/14/2018 93.8  82.0 - 95.0 fL Final   • MCH 11/14/2018 33.4* 28.0 - 32.0 pg Final   • MCHC 11/14/2018 35.6  33.0 - 36.0 g/dL Final   • RDW 11/14/2018 12.1  12.0 - 15.0 % Final   • RDW-SD 11/14/2018 42.3  40.0 - 54.0 fl Final   • MPV 11/14/2018 10.1  6.0 - 12.0 fL Final   • Platelets 11/14/2018 225  130 - 400 10*3/mm3 Final   • Glucose 11/14/2018 159* 70 - 100 mg/dL Final   • BUN 11/14/2018 12  5 - 21 mg/dL Final   • Creatinine 11/14/2018 1.32  0.50 - 1.40 mg/dL Final   • Sodium 11/14/2018 139  135 - 145 mmol/L Final   • Potassium 11/14/2018 4.8  3.5 - 5.3 mmol/L Final   • Chloride 11/14/2018 100  98 - 110 mmol/L Final   • CO2 11/14/2018 29.0  24.0 - 31.0 mmol/L Final   • Calcium 11/14/2018 8.7  8.4 - 10.4 mg/dL Final   • eGFR Non African Amer 11/14/2018 57* >60 mL/min/1.73 Final   • BUN/Creatinine Ratio 11/14/2018 9.1  7.0 - 25.0 Final   • Anion Gap 11/14/2018 10.0  4.0 - 13.0 mmol/L Final   • WBC 11/15/2018 10.33  4.80 - 10.80 10*3/mm3 Final   • RBC 11/15/2018 3.79* 4.80 - 5.90 10*6/mm3 Final   • Hemoglobin 11/15/2018 12.6* 14.0 - 18.0 g/dL Final   • Hematocrit 11/15/2018 35.8* 40.0 - 52.0 % Final   • MCV 11/15/2018 94.5  82.0 - 95.0 fL Final   • MCH 11/15/2018 33.2* 28.0 - 32.0 pg Final   • MCHC 11/15/2018 35.2  33.0 - 36.0 g/dL Final   • RDW 11/15/2018 12.3  12.0 - 15.0 % Final   • RDW-SD 11/15/2018 42.5  40.0 - 54.0 fl Final   • MPV 11/15/2018 10.1  6.0 - 12.0 fL Final   • Platelets 11/15/2018 183  130 - 400 10*3/mm3 Final   • Glucose 11/15/2018 111* 70 - 100 mg/dL Final   • BUN 11/15/2018 14  5 - 21 mg/dL Final   • Creatinine 11/15/2018 1.13  0.50 - 1.40 mg/dL Final   • Sodium 11/15/2018 138  135 - 145 mmol/L Final   • Potassium 11/15/2018 4.4  3.5 - 5.3 mmol/L Final   • Chloride 11/15/2018 101  98 - 110 mmol/L Final   • CO2 11/15/2018 29.0  24.0 - 31.0 mmol/L Final   • Calcium 11/15/2018  8.0* 8.4 - 10.4 mg/dL Final   • eGFR Non  Amer 11/15/2018 68  >60 mL/min/1.73 Final   • BUN/Creatinine Ratio 11/15/2018 12.4  7.0 - 25.0 Final   • Anion Gap 11/15/2018 8.0  4.0 - 13.0 mmol/L Final   • Creatinine, Fluid 11/15/2018 1.1  mg/dL Final   • Hemoglobin 11/15/2018 13.5* 14.0 - 18.0 g/dL Final   • Hematocrit 11/15/2018 38.3* 40.0 - 52.0 % Final       Results for orders placed or performed during the hospital encounter of 11/14/18   CBC (No Diff)   Result Value Ref Range    WBC 15.17 (H) 4.80 - 10.80 10*3/mm3    RBC 4.67 (L) 4.80 - 5.90 10*6/mm3    Hemoglobin 15.6 14.0 - 18.0 g/dL    Hematocrit 43.8 40.0 - 52.0 %    MCV 93.8 82.0 - 95.0 fL    MCH 33.4 (H) 28.0 - 32.0 pg    MCHC 35.6 33.0 - 36.0 g/dL    RDW 12.1 12.0 - 15.0 %    RDW-SD 42.3 40.0 - 54.0 fl    MPV 10.1 6.0 - 12.0 fL    Platelets 225 130 - 400 10*3/mm3   Basic Metabolic Panel   Result Value Ref Range    Glucose 159 (H) 70 - 100 mg/dL    BUN 12 5 - 21 mg/dL    Creatinine 1.32 0.50 - 1.40 mg/dL    Sodium 139 135 - 145 mmol/L    Potassium 4.8 3.5 - 5.3 mmol/L    Chloride 100 98 - 110 mmol/L    CO2 29.0 24.0 - 31.0 mmol/L    Calcium 8.7 8.4 - 10.4 mg/dL    eGFR Non African Amer 57 (L) >60 mL/min/1.73    BUN/Creatinine Ratio 9.1 7.0 - 25.0    Anion Gap 10.0 4.0 - 13.0 mmol/L   CBC (No Diff)   Result Value Ref Range    WBC 10.33 4.80 - 10.80 10*3/mm3    RBC 3.79 (L) 4.80 - 5.90 10*6/mm3    Hemoglobin 12.6 (L) 14.0 - 18.0 g/dL    Hematocrit 35.8 (L) 40.0 - 52.0 %    MCV 94.5 82.0 - 95.0 fL    MCH 33.2 (H) 28.0 - 32.0 pg    MCHC 35.2 33.0 - 36.0 g/dL    RDW 12.3 12.0 - 15.0 %    RDW-SD 42.5 40.0 - 54.0 fl    MPV 10.1 6.0 - 12.0 fL    Platelets 183 130 - 400 10*3/mm3   Basic Metabolic Panel   Result Value Ref Range    Glucose 111 (H) 70 - 100 mg/dL    BUN 14 5 - 21 mg/dL    Creatinine 1.13 0.50 - 1.40 mg/dL    Sodium 138 135 - 145 mmol/L    Potassium 4.4 3.5 - 5.3 mmol/L    Chloride 101 98 - 110 mmol/L    CO2 29.0 24.0 - 31.0 mmol/L    Calcium 8.0  (L) 8.4 - 10.4 mg/dL    eGFR Non African Amer 68 >60 mL/min/1.73    BUN/Creatinine Ratio 12.4 7.0 - 25.0    Anion Gap 8.0 4.0 - 13.0 mmol/L   Creatinine, Body Fluid - Body Fluid, Peritoneum   Result Value Ref Range    Creatinine, Fluid 1.1 mg/dL   Hemoglobin & Hematocrit, Blood   Result Value Ref Range    Hemoglobin 13.5 (L) 14.0 - 18.0 g/dL    Hematocrit 38.3 (L) 40.0 - 52.0 %   Type & Screen   Result Value Ref Range    ABO Type A     RH type Negative     Antibody Screen Negative     T&S Expiration Date 11/17/2018 11:59:59 PM    Tissue Pathology Exam   Result Value Ref Range    Case Report       Surgical Pathology Report                         Case: [id number]                                  Authorizing Provider:  Manfred Vargas MD    Collected:           11/14/2018 10:02 AM          Ordering Location:     [facility] OR  Received:            11/15/2018 04:50 AM          Pathologist:           Nelda Vela MD                                                          Specimens:   1) - Prostate, PROSTATE                                                                             2) - Pelvis, Right pelvic lymph nodes                                                               3) - Pelvis, Left pelvic lymph nodes                                                       Final Diagnosis       1.  Prostate, radical prostatectomy:  Prostatic adenocarcinoma, Shay score 8 (4+4), with a tertiary pattern of 3  Prostatic adenocarcinoma involves the right and left lobes  Tumor encompasses 28% of the prostate gland  Margins of resection are negative  Bilateral seminal vesicles and vas deferens are negative    2.  Lymph nodes, right pelvic, dissection:  One lymph node, negative for metastatic carcinoma (0/1)    3.  Lymph node, left pelvic, dissection:  One lymph node, negative for metastatic carcinoma (0/1)        Synoptic Checklist       PROSTATE GLAND: Radical Prostatectomy  (Prostate Res - All  "Specimens)         SPECIMEN      Procedure:    Radical prostatectomy       Prostate Size:            Prostate Weight (g):    36 g        Prostate Greatest Dimension in Centimeters (cm):    5.3 Centimeters (cm)      TUMOR      Histologic Type:    Acinar adenocarcinoma       Histologic Grade:            Shay Pattern:              Primary Shay Pattern:    Pattern 4           Secondary Shay Pattern:    Pattern 4           Tertiary Shay Pattern:    Pattern 3           Total Kerrick Score:    8           Grade Group:    4       Tumor Extent:            Tumor Quantitation:    Estimated percentage of prostate involved by tumor: 28 %        Extraprostatic Extension (EPE):    Not identified         Urinary Bladder Neck Invasion:    Not identified         Seminal Vesicle Invasion:    Not identified       Accessory Findings:            Treatment Effect:    No known presurgical therapy       MARGINS      Margins:    Uninvolved by invasive carcinoma         :    Benign prostate glands present at surgical margin       LYMPH NODES      Number of Lymph Nodes Involved:    0       Number of Lymph Nodes Examined:    2       PATHOLOGIC STAGE CLASSIFICATION (pTNM, AJCC 8th Edition)      TNM Descriptors:    Not applicable       Primary Tumor (pT):    pT2       Regional Lymph Nodes (pN):    pN0       Distant Metastasis (pM):    Not applicable - pM cannot be determined from the submitted specimen(s)       Gross Description       Specimen #1  *Surgery - Radical prostatectomy  *Designation - \"Prostate\"  *Labelled - Patient's name and accession number  *Received - in formalin is a prostate with attached bilateral seminal vesicles and vas deferens.    PROSTATE  *Measurement - 5.3 cm  right to left; 2.9 cm apex to base; 3.3 cm anterior to posterior  *Weight - 36 grams   *Capsule - tan/pink and intact.  Smooth and glistening with a small amount of adherent soft tissue.  Right lobe is inked black, left lobe is inked blue and the " posterior midline is inked yellow.    *Sectioning - The prostate is sectioned from apex to base.  · Right Lobe - diffusely yellow to gray and firm and extending to within less than 0.1 cm of the anterior, lateral, and posterior capsule of each region.  The firmness appears to extend to cross the midline.    · Left Lobe -  There is an area of yellow/tan, firm discoloration in the posterior midline aspect of the apical and base regions measuring 0.9 cm  in greatest dimension and extending to within less than 0.1 cm of the posterior capsule.  The second area of yellow to gray firm tissue is identified in the posterior lateral aspect of the mid region measuring 1.5 cm in greatest dimension and extending to within less than 0.1 cm of the posterior and lateral capsule.  The posterior midline lesion appears to be a continuation on the right lobe.  The mid region posterior lateral firmness does not appear to cross the midline.     Right Seminal Vesicle and Vas Deferens  *Seminal vesicle measurement - 3.5 x 1.7 x 0.9 cm   *External surface - tan/brown, intact and unremarkable  *Sectioning - pink/gray and unremarkable  *Vas deferens measurement - 4.6 cm in length by 0.7 cm in diameter.   *External surface - tan/brown, intact and unremarkable  *Sectioning - unremarkable    Left Seminal Vesicle and Vas Deferens  *Seminal vesicle measurement - 3.5 x 1.4 x 0.8 cm   *External surface - tan/brown, intact and unremarkable  *Sectioning - pink/gray and unremarkable  *Vas deferens measurement - 4.1 cm in length by 0.5 cm in diameter  *External surface - tan/brown, intact and unremarkable  *Sectioning - unremarkable    Block Summary  1A and 1B -  Apical margin shaved, sectioned and submitted from right to left  1C - Right bladder neck margin shaved and sectioned  1D - Left bladder neck margin shave and sectioned  1E - Representative sections of apical region of the right lobe  1F and 1G - Representative sections of mid region of the  "right lobe  1H and 1I - Representative sections of base region of the right lobe  1J - Representative sections of right seminal vesicle and vas deferens  1K and 1L - Representative sections of apical region of the left lobe  1M through 1P - Representative sections of mid region of the left lobe  1Q through 1S - Representative sections of base region of the left lobe  1T - Representative sections of left seminal vesicle and vas deferens    Specimen #2  Received in formalin labeled with the patient's name, date of birth, and designated \"right pelvic lymph nodes\".  The specimen consists of an irregularly shaped fragment of yellow-pink fatty tissue measuring 5.5 x 3.7 x 1.0 cm.  The external surface is lobulated with surgical disruption.  Dissection reveals a single disrupted lymph node candidate measuring 1.0 cm in greatest dimension.  The cut surface is pink-gray and uniform.  The amount of fatty replacement cannot be definitively determined due to disruption.     2A-bisected lymph node candidate    Specimen #3  Received in formalin labeled with the patient's name, date of birth, and designated \"pelvic lymph nodes\".  The specimen consists of a fragment of yellow-pink soft fatty tissue measuring 4.5 x 3.1 x 0.5 cm.  The external surface is yellow-pink, lobulated with surgical disruption.  Dissection reveals a small amount of pink-gray stringy fibrous tissue with a small lymph node candidate measuring 0.2 cm in greatest dimension.  No additional lymph node candidates are identified.    3A-sections of slightly stringy fibrous tissue and lymph node candidate  3B through 3E-remaining soft tissue                 Microscopic Description       1.  Histologic sections demonstrate prostatic adenocarcinoma predominant pattern 4, secondary pattern 4 with a tertiary pattern 3, for total Shay score of 8.  Prostatic adenocarcinoma encompasses approximately 28 of the prostate gland.  The margins of resection are negative for " prostatic adenocarcinoma.  Rare benign prostate glands are present at the margin.  Bilateral seminal vesicles and vas deferens are negative for prostatic adenocarcinoma.    2.  Histologic sections demonstrate one lymph node with fatty replacement.  Negative for metastatic carcinoma.    3.  Histologic sections demonstrate a single small lymph node, negative for metastatic carcinoma.      EMR Dragon/transcription disclaimer: All of this report is electronic transcription/translation of spoken language to printed text. The electronic translation of spoken language may be erroneous, or at times, nonsensical words or phrases may be inadvertently transcribed. Although I have reviewed the report for such errors, some may still exist.       Patient's Body mass index is 31.25 kg/m². BMI is above normal parameters. Recommendations include: educational material.    Assessment and Plan    Caden was seen today for prostate cancer.    Diagnoses and all orders for this visit:    Prostate cancer (CMS/[facility])  -     PSA DIAGNOSTIC; Future    I reviewed his pathology.  Shay 4+4 prostate cancer negative margins no lymph node involvement.  I reviewed his cystogram today which showed no evidence of leak.  Catheter was removed today and he is able to void without difficulty.  Today we discussed Q exercises and I will see him back in 5 weeks with his first PSA

## 2018-11-20 NOTE — PATIENT INSTRUCTIONS

## 2018-12-18 LAB — PSA SERPL-MCNC: <0.07 NG/ML (ref 0–4)

## 2018-12-20 ENCOUNTER — RESULTS ENCOUNTER (OUTPATIENT)
Dept: UROLOGY | Facility: CLINIC | Age: 53
End: 2018-12-20

## 2018-12-20 DIAGNOSIS — C61 PROSTATE CANCER (HCC): ICD-10-CM

## 2018-12-27 ENCOUNTER — OFFICE VISIT (OUTPATIENT)
Dept: UROLOGY | Facility: CLINIC | Age: 53
End: 2018-12-27

## 2018-12-27 VITALS
SYSTOLIC BLOOD PRESSURE: 138 MMHG | HEIGHT: 72 IN | DIASTOLIC BLOOD PRESSURE: 74 MMHG | WEIGHT: 231.2 LBS | TEMPERATURE: 97.7 F | BODY MASS INDEX: 31.31 KG/M2

## 2018-12-27 DIAGNOSIS — C61 PROSTATE CANCER (HCC): Primary | ICD-10-CM

## 2018-12-27 DIAGNOSIS — R32 URINARY INCONTINENCE, UNSPECIFIED TYPE: ICD-10-CM

## 2018-12-27 LAB
BILIRUB BLD-MCNC: NEGATIVE MG/DL
CLARITY, POC: CLEAR
COLOR UR: YELLOW
GLUCOSE UR STRIP-MCNC: NEGATIVE MG/DL
KETONES UR QL: NEGATIVE
LEUKOCYTE EST, POC: ABNORMAL
NITRITE UR-MCNC: NEGATIVE MG/ML
PH UR: 6 [PH] (ref 5–8)
PROT UR STRIP-MCNC: NEGATIVE MG/DL
RBC # UR STRIP: ABNORMAL /UL
SP GR UR: 1.01 (ref 1–1.03)
UROBILINOGEN UR QL: NORMAL

## 2018-12-27 PROCEDURE — 81003 URINALYSIS AUTO W/O SCOPE: CPT | Performed by: UROLOGY

## 2018-12-27 PROCEDURE — 99024 POSTOP FOLLOW-UP VISIT: CPT | Performed by: UROLOGY

## 2018-12-27 RX ORDER — METOPROLOL TARTRATE 50 MG/1
TABLET, FILM COATED ORAL
Refills: 11 | COMMUNITY
Start: 2018-12-02 | End: 2022-08-03

## 2018-12-27 NOTE — PROGRESS NOTES
Mr. Hendrix is 53 y.o. male    Chief Complaint   Patient presents with   • Prostate Cancer       History of Present Illness   He was initially diagnosed with prostate cancer about  6 week(s) ago. This was identified in the context of elevated psa.  Severity of the disease is best described as probable organ confined disease. Previous or current management includes robot assisted laparoscpic prostatectomy. Associated symptoms include urinary incontinence and erectile dysfunction. Currently his PSA is  <0.07.     The following portions of the patient's history were reviewed and updated as appropriate: allergies, current medications, past family history, past medical history, past social history, past surgical history and problem list.    Review of Systems   Constitutional: Negative for chills and fever.   Gastrointestinal: Negative for abdominal pain, anal bleeding and blood in stool.   Genitourinary: Negative for dysuria, frequency, hematuria and urgency.         Current Outpatient Medications:   •  metoprolol tartrate (LOPRESSOR) 50 MG tablet, TK 1 T PO BID, Disp: , Rfl: 11    Past Medical History:   Diagnosis Date   • Hypertension    • Prostate CA (CMS/HCC)        Past Surgical History:   Procedure Laterality Date   • HAND SURGERY     • KNEE SURGERY     • PROSTATECTOMY N/A 11/14/2018    Procedure: PROSTATECTOMY LAPAROSCOPIC WITH DAVINCI SI ROBOT, BILATERAL PELVIC LYMPH NODE DISSECTION;  Surgeon: Manfred Vargas MD;  Location: [facility] OR;  Service: DaVinci   • SHOULDER SURGERY         Social History     Socioeconomic History   • Marital status:      Spouse name: Not on file   • Number of children: Not on file   • Years of education: Not on file   • Highest education level: Not on file   Tobacco Use   • Smoking status: Never Smoker   • Smokeless tobacco: Current User     Types: Snuff   • Tobacco comment: 1 CAN EVERY 2 DAYS   Substance and Sexual Activity   • Alcohol use: No   • Drug use: No   • Sexual  "activity: Defer       Family History   Problem Relation Age of Onset   • No Known Problems Father    • No Known Problems Mother        Objective    /74   Temp 97.7 °F (36.5 °C)   Ht 182.9 cm (72\")   Wt 105 kg (231 lb 3.2 oz)   BMI 31.36 kg/m²     Physical Exam    Results Encounter on 12/20/2018   Component Date Value Ref Range Status   • PSA 12/18/2018 <0.070  0.000 - 4.000 ng/mL Final       Results for orders placed or performed in visit on 12/27/18   POC Urinalysis Dipstick, Multipro   Result Value Ref Range    Color Yellow Yellow, Straw, Dark Yellow, Nikki    Clarity, UA Clear Clear    Glucose, UA Negative Negative, 1000 mg/dL (3+) mg/dL    Bilirubin Negative Negative    Ketones, UA Negative Negative    Specific Gravity  1.015 1.005 - 1.030    Blood, UA Trace (A) Negative    pH, Urine 6.0 5.0 - 8.0    Protein, POC Negative Negative mg/dL    Urobilinogen, UA Normal Normal    Nitrite, UA Negative Negative    Leukocytes Trace (A) Negative     Patient's Body mass index is 31.36 kg/m². BMI is above normal parameters. Recommendations include: educational material.    Assessment and Plan    Caden was seen today for prostate cancer.    Diagnoses and all orders for this visit:    Prostate cancer (CMS/[facility])  -     POC Urinalysis Dipstick, Multipro  -     PSA DIAGNOSTIC; Future    Urinary incontinence, unspecified type    Patient is doing well.  PSA is undetectable with no signs of prostate cancer at this time.  He is also doing very well from an incontinence standpoint.  He is using less than 1 pad per day at this point.  Overall very happy with those results and have encouraged him to continue with his Kegel exercises.  He is getting partial erections in the morning, which I again explained to him that this is very promising this early postoperatively.  At his next appointment we will consider starting PDE 5 inhibitors  "

## 2018-12-27 NOTE — PATIENT INSTRUCTIONS

## 2019-03-20 DIAGNOSIS — C61 PROSTATE CANCER (HCC): ICD-10-CM

## 2019-03-20 LAB — PSA SERPL-MCNC: <0.07 NG/ML (ref 0–4)

## 2019-03-26 ENCOUNTER — OFFICE VISIT (OUTPATIENT)
Dept: UROLOGY | Facility: CLINIC | Age: 54
End: 2019-03-26

## 2019-03-26 VITALS — BODY MASS INDEX: 32.37 KG/M2 | TEMPERATURE: 97.4 F | HEIGHT: 72 IN | WEIGHT: 239 LBS

## 2019-03-26 DIAGNOSIS — C61 PROSTATE CANCER (HCC): Primary | ICD-10-CM

## 2019-03-26 DIAGNOSIS — N52.9 ERECTILE DYSFUNCTION, UNSPECIFIED ERECTILE DYSFUNCTION TYPE: ICD-10-CM

## 2019-03-26 DIAGNOSIS — R32 URINARY INCONTINENCE, UNSPECIFIED TYPE: ICD-10-CM

## 2019-03-26 LAB
BILIRUB BLD-MCNC: NEGATIVE MG/DL
CLARITY, POC: CLEAR
COLOR UR: YELLOW
GLUCOSE UR STRIP-MCNC: NEGATIVE MG/DL
KETONES UR QL: NEGATIVE
LEUKOCYTE EST, POC: NEGATIVE
NITRITE UR-MCNC: NEGATIVE MG/ML
PH UR: 5 [PH] (ref 5–8)
PROT UR STRIP-MCNC: NEGATIVE MG/DL
RBC # UR STRIP: ABNORMAL /UL
SP GR UR: 1.02 (ref 1–1.03)
UROBILINOGEN UR QL: NORMAL

## 2019-03-26 PROCEDURE — 81003 URINALYSIS AUTO W/O SCOPE: CPT | Performed by: UROLOGY

## 2019-03-26 PROCEDURE — 99214 OFFICE O/P EST MOD 30 MIN: CPT | Performed by: UROLOGY

## 2019-03-26 RX ORDER — SILDENAFIL 100 MG/1
100 TABLET, FILM COATED ORAL DAILY PRN
Qty: 15 TABLET | Refills: 11 | Status: SHIPPED | OUTPATIENT
Start: 2019-03-26 | End: 2020-09-25 | Stop reason: SDUPTHER

## 2019-03-26 NOTE — PROGRESS NOTES
Mr. Hendrix is 53 y.o. male    Chief Complaint   Patient presents with   • Prostate Cancer   • Urinary Incontinence   • Erectile Dysfunction       History of Present Illness   He was initially diagnosed with prostate cancer about  5 month(s) ago. This was identified in the context of elevated psa.  Severity of the disease is best described as probable organ confined disease. Previous or current management includes robot assisted laparoscpic prostatectomy. Associated symptoms include urinary incontinence and erectile dysfunction. Currently his PSA is  <0.07.     The following portions of the patient's history were reviewed and updated as appropriate: allergies, current medications, past family history, past medical history, past social history, past surgical history and problem list.    Review of Systems   Constitutional: Negative for chills and fever.   Gastrointestinal: Negative for abdominal pain, anal bleeding and blood in stool.   Genitourinary: Positive for frequency and urgency. Negative for decreased urine volume, difficulty urinating, discharge, dysuria, enuresis, flank pain, genital sores, hematuria, penile pain, penile swelling, scrotal swelling and testicular pain.       I have reviewed the review of systems      Current Outpatient Medications:   •  metoprolol tartrate (LOPRESSOR) 50 MG tablet, TK 1 T PO BID, Disp: , Rfl: 11  •  sildenafil (VIAGRA) 100 MG tablet, Take 1 tablet by mouth Daily As Needed for erectile dysfunction., Disp: 15 tablet, Rfl: 11    Past Medical History:   Diagnosis Date   • Hypertension    • Prostate CA (CMS/HCC)        Past Surgical History:   Procedure Laterality Date   • HAND SURGERY     • KNEE SURGERY     • PROSTATECTOMY N/A 11/14/2018    Procedure: PROSTATECTOMY LAPAROSCOPIC WITH DAVINCI SI ROBOT, BILATERAL PELVIC LYMPH NODE DISSECTION;  Surgeon: Manfred Vargas MD;  Location: [facility] OR;  Service: DaVinci   • SHOULDER SURGERY         Social History     Socioeconomic History  "  • Marital status:      Spouse name: Not on file   • Number of children: Not on file   • Years of education: Not on file   • Highest education level: Not on file   Tobacco Use   • Smoking status: Never Smoker   • Smokeless tobacco: Current User     Types: Snuff   • Tobacco comment: 1 CAN EVERY 2 DAYS   Substance and Sexual Activity   • Alcohol use: No   • Drug use: No   • Sexual activity: Defer       Family History   Problem Relation Age of Onset   • No Known Problems Father    • No Known Problems Mother        Objective    Temp 97.4 °F (36.3 °C)   Ht 182.9 cm (72\")   Wt 108 kg (239 lb)   BMI 32.41 kg/m²     Physical Exam    Orders Only on 03/20/2019   Component Date Value Ref Range Status   • PSA 03/20/2019 <0.070  0.000 - 4.000 ng/mL Final       Results for orders placed or performed in visit on 03/26/19   POC Urinalysis Dipstick, Multipro   Result Value Ref Range    Color Yellow Yellow, Straw, Dark Yellow, Nikki    Clarity, UA Clear Clear    Glucose, UA Negative Negative, 1000 mg/dL (3+) mg/dL    Bilirubin Negative Negative    Ketones, UA Negative Negative    Specific Gravity  1.025 1.005 - 1.030    Blood, UA Trace (A) Negative    pH, Urine 5.0 5.0 - 8.0    Protein, POC Negative Negative mg/dL    Urobilinogen, UA Normal Normal    Nitrite, UA Negative Negative    Leukocytes Negative Negative     Patient's Body mass index is 32.41 kg/m². BMI is above normal parameters. Recommendations include: educational material.    Assessment and Plan    Caden was seen today for prostate cancer, urinary incontinence and erectile dysfunction.    Diagnoses and all orders for this visit:    Prostate cancer (CMS/[facility])  -     POC Urinalysis Dipstick, Multipro  -     PSA DIAGNOSTIC; Future    Urinary incontinence, unspecified type  -     POC Urinalysis Dipstick, Multipro    Erectile dysfunction, unspecified erectile dysfunction type  -     sildenafil (VIAGRA) 100 MG tablet; Take 1 tablet by mouth Daily As Needed for " "erectile dysfunction.    Prostate cancer, urinary incontinence, erectile dysfunction, 3 stable chronic conditions.  In terms of his prostate cancer, I reviewed his PSA today which is undetectable.  Repeat PSA in 3 months.  His incontinence continue to improve and he is again down to less than 1 pad a day.  He is overall very happy with his results.  He does still get partial erections in the morning but is interested in medications at this point.  We discussed options and I will start him on Viagra.  I advised him to use 100 mg pill prior to sex, or if he is not planning to be sexually active he should use 50 mg every other night to help with penile blood flow.  He understands the risk and benefits of this medications as outlined below.    The patient and I discussed the likely etiology of his impotence.  We discussed diagnostic evaluation for erectile dysfunction is possible but usually does not change the management.  He understands that goal directed therapy is probably the best approach since no matter what the etiology, a PDE 5 inhibitor is the least invasive way to manage ED.  While there are risks to PDE 5 inhibitors which I discussed as described below, it was relayed to him that they probably provide the most \"natural\" erection.  Other options discussed included penile injections, urethral suppositories, vacuum erection device without or without the tension ring, and penile prosthesis.  The risks of headache, visual changes, hypotension, priapism, GI distress, myalgias, and the contraindication of nitrates were discussed with the patient.  The patient denies a history of nitrates either in long acting or prn use form.  He denies a history of MI, heart failure, or stroke in the last 6 months.  I discussed with him that alpha blockers should be taken at least 4 hours apart from the PDE 5 inhibitors.  He was given a prescription for Viagra  100 mg for as needed use. I reminded him that facial flushing is not " usual with this medication, and that it is most effective when taken on an empty stomach without alcohol.  He understands that he needs to be forthright about using this medication in any medical situation, especially when giving nitrates is being considered.

## 2019-03-26 NOTE — PATIENT INSTRUCTIONS

## 2019-06-04 DIAGNOSIS — C61 PROSTATE CANCER (HCC): ICD-10-CM

## 2019-06-05 LAB — PSA SERPL-MCNC: <0.014 NG/ML (ref 0–4)

## 2019-06-11 ENCOUNTER — OFFICE VISIT (OUTPATIENT)
Dept: UROLOGY | Facility: CLINIC | Age: 54
End: 2019-06-11

## 2019-06-11 VITALS
TEMPERATURE: 98.6 F | WEIGHT: 232 LBS | SYSTOLIC BLOOD PRESSURE: 136 MMHG | DIASTOLIC BLOOD PRESSURE: 86 MMHG | HEIGHT: 72 IN | BODY MASS INDEX: 31.42 KG/M2

## 2019-06-11 DIAGNOSIS — R32 URINARY INCONTINENCE, UNSPECIFIED TYPE: ICD-10-CM

## 2019-06-11 DIAGNOSIS — N52.9 ERECTILE DYSFUNCTION, UNSPECIFIED ERECTILE DYSFUNCTION TYPE: ICD-10-CM

## 2019-06-11 DIAGNOSIS — C61 PROSTATE CANCER (HCC): Primary | ICD-10-CM

## 2019-06-11 LAB
BILIRUB BLD-MCNC: NEGATIVE MG/DL
CLARITY, POC: CLEAR
COLOR UR: YELLOW
GLUCOSE UR STRIP-MCNC: NEGATIVE MG/DL
KETONES UR QL: NEGATIVE
LEUKOCYTE EST, POC: NEGATIVE
NITRITE UR-MCNC: NEGATIVE MG/ML
PH UR: 5.5 [PH] (ref 5–8)
PROT UR STRIP-MCNC: NEGATIVE MG/DL
RBC # UR STRIP: NEGATIVE /UL
SP GR UR: 1.02 (ref 1–1.03)
UROBILINOGEN UR QL: NORMAL

## 2019-06-11 PROCEDURE — 99214 OFFICE O/P EST MOD 30 MIN: CPT | Performed by: UROLOGY

## 2019-06-11 PROCEDURE — 81003 URINALYSIS AUTO W/O SCOPE: CPT | Performed by: UROLOGY

## 2019-06-11 NOTE — PROGRESS NOTES
Mr. Hendrix is 54 y.o. male    Chief Complaint   Patient presents with   • Prostate Cancer   • Urinary Incontinence   • Erectile Dysfunction       History of Present Illness   He was initially diagnosed with prostate cancer about  7 month(s) ago. This was identified in the context of elevated psa.  Severity of the disease is best described as probable organ confined disease. Previous or current management includes robot assisted laparoscpic prostatectomy. Associated symptoms include urinary incontinence. Currently his PSA is  undetectable.     The following portions of the patient's history were reviewed and updated as appropriate: allergies, current medications, past family history, past medical history, past social history, past surgical history and problem list.    Review of Systems   Constitutional: Negative for chills and fever.   Gastrointestinal: Negative for abdominal pain, anal bleeding and blood in stool.   Genitourinary: Negative for dysuria, frequency, hematuria and urgency.       I have reviewed the review of systems      Current Outpatient Medications:   •  metoprolol tartrate (LOPRESSOR) 50 MG tablet, TK 1 T PO BID, Disp: , Rfl: 11  •  sildenafil (VIAGRA) 100 MG tablet, Take 1 tablet by mouth Daily As Needed for erectile dysfunction., Disp: 15 tablet, Rfl: 11    Past Medical History:   Diagnosis Date   • Hypertension    • Prostate CA (CMS/HCC)        Past Surgical History:   Procedure Laterality Date   • HAND SURGERY     • KNEE SURGERY     • PROSTATECTOMY N/A 11/14/2018    Procedure: PROSTATECTOMY LAPAROSCOPIC WITH DAVINCI SI ROBOT, BILATERAL PELVIC LYMPH NODE DISSECTION;  Surgeon: Manfred Vargas MD;  Location: [facility] OR;  Service: DaVinci   • SHOULDER SURGERY         Social History     Socioeconomic History   • Marital status:      Spouse name: Not on file   • Number of children: Not on file   • Years of education: Not on file   • Highest education level: Not on file   Tobacco Use   •  "Smoking status: Never Smoker   • Smokeless tobacco: Current User     Types: Snuff   • Tobacco comment: 1 CAN EVERY 2 DAYS   Substance and Sexual Activity   • Alcohol use: No   • Drug use: No   • Sexual activity: Defer       Family History   Problem Relation Age of Onset   • No Known Problems Father    • No Known Problems Mother        Objective    /86   Temp 98.6 °F (37 °C)   Ht 182.9 cm (72\")   Wt 105 kg (232 lb)   BMI 31.46 kg/m²     Physical Exam    Orders Only on 06/04/2019   Component Date Value Ref Range Status   • PSA 06/04/2019 <0.014  0.000 - 4.000 ng/mL Final       Results for orders placed or performed in visit on 06/11/19   POC Urinalysis Dipstick, Multipro   Result Value Ref Range    Color Yellow Yellow, Straw, Dark Yellow, Nikki    Clarity, UA Clear Clear    Glucose, UA Negative Negative, 1000 mg/dL (3+) mg/dL    Bilirubin Negative Negative    Ketones, UA Negative Negative    Specific Gravity  1.025 1.005 - 1.030    Blood, UA Negative Negative    pH, Urine 5.5 5.0 - 8.0    Protein, POC Negative Negative mg/dL    Urobilinogen, UA Normal Normal    Nitrite, UA Negative Negative    Leukocytes Negative Negative     Assessment and Plan      Patient's Body mass index is 31.46 kg/m². BMI is within normal parameters. No follow-up required..        Caden was seen today for prostate cancer, urinary incontinence and erectile dysfunction.    Diagnoses and all orders for this visit:    Prostate cancer (CMS/[facility])  -     POC Urinalysis Dipstick, Multipro  -     PSA DIAGNOSTIC; Future    Urinary incontinence, unspecified type    Erectile dysfunction, unspecified erectile dysfunction type    Prostate cancer, urinary incontinence, erectile dysfunction, 3 chronic stable conditions.  His PSA is undetectable.  No evidence recurrence of prostate cancer.  PSA in 3 months.  Patient states that he is down to 1 pad or less a day.  He is overall very happy with his results.  Continue with Keagle exercises.  With regards " to his erectile dysfunction, he was prescribed Viagra at the last visit.  He has not tried this to this point.  We will continue to monitor.

## 2019-06-11 NOTE — PATIENT INSTRUCTIONS

## 2019-09-03 DIAGNOSIS — C61 PROSTATE CANCER (HCC): ICD-10-CM

## 2019-09-03 LAB — PSA SERPL-MCNC: <0.014 NG/ML (ref 0–4)

## 2019-09-10 NOTE — PROGRESS NOTES
Subjective    Mr. Hendrix is 54 y.o. male    Chief Complaint: Prostate Cancer    History of Present Illness  54-year-old male follow-up for history of prostate cancer status post RALP by Dr. Vargas on 11/14/2018 for [id number] Shay 8 prostate cancer with negative margins.  Context- His PSA remains undetectable.  Associated symptoms- JOSE improving down to 1 pad per day.  Has ED - Partial erections since surgery.  Has not yet used the Viagra    I spent time today reviewing and summarizing old records last urology clinic visit with Dr. Vargas 6/11/19.      Lab Results   Component Value Date    PSA <0.014 09/03/2019    PSA <0.014 06/04/2019    PSA <0.070 03/20/2019         The following portions of the patient's history were reviewed and updated as appropriate: allergies, current medications, past family history, past medical history, past social history, past surgical history and problem list.    Review of Systems   Constitutional: Negative.    HENT: Negative.    Eyes: Negative.    Respiratory: Negative.    Cardiovascular: Negative.    Gastrointestinal: Negative.    Endocrine: Negative.    Genitourinary: Negative.    Musculoskeletal: Negative.    Neurological: Negative.          Current Outpatient Medications:   •  metoprolol tartrate (LOPRESSOR) 50 MG tablet, TK 1 T PO BID, Disp: , Rfl: 11  •  sildenafil (VIAGRA) 100 MG tablet, Take 1 tablet by mouth Daily As Needed for erectile dysfunction., Disp: 15 tablet, Rfl: 11    Past Medical History:   Diagnosis Date   • Hypertension    • Prostate CA (CMS/HCC)        Past Surgical History:   Procedure Laterality Date   • HAND SURGERY     • KNEE SURGERY     • PROSTATECTOMY N/A 11/14/2018    Procedure: PROSTATECTOMY LAPAROSCOPIC WITH DAVINCI SI ROBOT, BILATERAL PELVIC LYMPH NODE DISSECTION;  Surgeon: Manfred aVrgas MD;  Location: [facility] OR;  Service: DaVinci   • SHOULDER SURGERY         Social History     Socioeconomic History   • Marital status:      Spouse name: Not  on file   • Number of children: Not on file   • Years of education: Not on file   • Highest education level: Not on file   Tobacco Use   • Smoking status: Never Smoker   • Smokeless tobacco: Current User     Types: Snuff   • Tobacco comment: 1 CAN EVERY 2 DAYS   Substance and Sexual Activity   • Alcohol use: No   • Drug use: No   • Sexual activity: Defer       Family History   Problem Relation Age of Onset   • No Known Problems Father    • No Known Problems Mother        Objective    There were no vitals taken for this visit.    Physical Exam  Constitutional: Well nourished, Well developed; No apparent distress.  His vital signs are reviewed  Psychiatric: Appropriate affect; Alert and oriented  Eyes: Unremarkable  Musculoskeletal: Normal gait and station  GI: Abdomen is soft, non-tender; scars well healed no hernia.   Respiratory: No distress; Unlabored movement; No accessory musculature needed with symmetric movements  Skin: No pallor or diaphoresis  ; Penis and testicles are normal;      Results for orders placed or performed in visit on 09/03/19   PSA DIAGNOSTIC   Result Value Ref Range    PSA <0.014 0.000 - 4.000 ng/mL     Patient's There is no height or weight on file to calculate BMI. BMI is above normal parameters. Recommendations include: educational material.    International Prostate Symptom Score  The following is posted based on patient questionnaire answers:  0 - not at all    1-7 mild symptoms  1- Less than one time in five  8-19 moderate symptoms  2 -Less than half the time  20-35 severe symptoms  3 - About half the time  4 - More than half the time  5 - Almost always     For following sections:  Incomplete Emptying: - How often have you had the sensation  of not emptying your bladder completely after you finished urinating?  0  Frequency: -How often have you had to urinate again less than   two hours after you finished urinating?      0  Intermittency: -How often have you found you stopped and  started again  Several times when you urinate?       0  Urgency: -How often do you find it difficult to postpone urination?             0  Weak stream: - How often have you had a weak urinary stream?             0  Straining: - How often have you had to push or strain to begin  Urination?          0  Sleeping: -How many times did you most typically get up to urinate   From the time you went to bed at night until the time you got up in the   1  Morning          Total `  1    Quality of Life  How would you feel if you had to live with your urinary condition the way   0  It is now, no better, no worse for the rest of your life?    Where: 0=delighted; 1= pleased, 2= mostly satisfied, 3= mixed, 4 = mostly  Dissatisfied, 5= Unhappy, 6 = terrible      Assessment and Plan    Diagnoses and all orders for this visit:    History of prostate cancer  -     POC Urinalysis Dipstick, Multipro  -     PSA DIAGNOSTIC; Future    Erectile dysfunction after radical prostatectomy    Stress incontinence of urine    Essential hypertension      Doing well MILLER s/p RALP.  Recommend continue with Kegel he will use the Viagra for penile rehab.  Follow-up 3 months with PSA or sooner as needed exercises for his JOSE.

## 2019-09-13 ENCOUNTER — OFFICE VISIT (OUTPATIENT)
Dept: UROLOGY | Facility: CLINIC | Age: 54
End: 2019-09-13

## 2019-09-13 VITALS — BODY MASS INDEX: 31.69 KG/M2 | HEIGHT: 72 IN | TEMPERATURE: 98 F | WEIGHT: 234 LBS

## 2019-09-13 DIAGNOSIS — Z85.46 HISTORY OF PROSTATE CANCER: Primary | ICD-10-CM

## 2019-09-13 DIAGNOSIS — I10 ESSENTIAL HYPERTENSION: ICD-10-CM

## 2019-09-13 DIAGNOSIS — N39.3 STRESS INCONTINENCE OF URINE: ICD-10-CM

## 2019-09-13 DIAGNOSIS — N52.31 ERECTILE DYSFUNCTION AFTER RADICAL PROSTATECTOMY: ICD-10-CM

## 2019-09-13 PROCEDURE — 81003 URINALYSIS AUTO W/O SCOPE: CPT | Performed by: UROLOGY

## 2019-09-13 PROCEDURE — 99214 OFFICE O/P EST MOD 30 MIN: CPT | Performed by: UROLOGY

## 2019-12-05 DIAGNOSIS — Z85.46 HISTORY OF PROSTATE CANCER: ICD-10-CM

## 2019-12-05 LAB — PSA SERPL-MCNC: <0.014 NG/ML (ref 0–4)

## 2019-12-10 NOTE — PROGRESS NOTES
Subjective    Mr. Hendrix is 54 y.o. male    Chief Complaint: Prostate Cancer    History of Present Illness    54-year-old male follow-up for history of prostate cancer status post RALP by Dr. Vargas on 11/14/2018 for [id number] Jesse 8 prostate cancer with negative margins.  Context- His PSA done 12/5/19 remains undetectable.  Associated symptoms- JOSE improved he is no longer using pads.    ED is improved by using sildenafil.  Quality painless.  Timing 13 months.  Onset 11/14/18.     The following portions of the patient's history were reviewed and updated as appropriate: allergies, current medications, past family history, past medical history, past social history, past surgical history and problem list.    Review of Systems   Constitutional: Negative for chills and fever.   Gastrointestinal: Negative for abdominal pain, anal bleeding and blood in stool.   Genitourinary: Negative for dysuria, frequency, hematuria and urgency.   All other systems reviewed and are negative.        Current Outpatient Medications:   •  metoprolol tartrate (LOPRESSOR) 50 MG tablet, TK 1 T PO BID, Disp: , Rfl: 11  •  sildenafil (VIAGRA) 100 MG tablet, Take 1 tablet by mouth Daily As Needed for erectile dysfunction., Disp: 15 tablet, Rfl: 11    Past Medical History:   Diagnosis Date   • Hypertension    • Prostate CA (CMS/HCC)        Past Surgical History:   Procedure Laterality Date   • HAND SURGERY     • KNEE SURGERY     • PROSTATECTOMY N/A 11/14/2018    Procedure: PROSTATECTOMY LAPAROSCOPIC WITH DAVINCI SI ROBOT, BILATERAL PELVIC LYMPH NODE DISSECTION;  Surgeon: Manfred Vargas MD;  Location: [facility] OR;  Service: DaVinci   • SHOULDER SURGERY         Social History     Socioeconomic History   • Marital status:      Spouse name: Not on file   • Number of children: Not on file   • Years of education: Not on file   • Highest education level: Not on file   Tobacco Use   • Smoking status: Never Smoker   • Smokeless tobacco: Current  User     Types: Snuff   • Tobacco comment: 1 CAN EVERY 2 DAYS   Substance and Sexual Activity   • Alcohol use: No   • Drug use: No   • Sexual activity: Defer       Family History   Problem Relation Age of Onset   • No Known Problems Father    • No Known Problems Mother        Objective    There were no vitals taken for this visit.    Physical Exam  Constitutional: Well nourished, Well developed; No apparent distress; Vital reviewed as above  Psychiatric: Appropriate affect; Alert and oriented  Eyes: Unremarkable  Musculoskeletal: Normal gait and station  GI: Abdomen is soft, non-tender  Respiratory: No distress; Unlabored movement; No accessory musculature needed with symmetric movements  Skin: No pallor or diaphoresis  Lymphatic: No adenopathy neck or groin      Results for orders placed or performed in visit on 12/05/19   PSA DIAGNOSTIC   Result Value Ref Range    PSA <0.014 0.000 - 4.000 ng/mL     Patient's There is no height or weight on file to calculate BMI. BMI is above normal parameters. Recommendations include: educational material.    International Prostate Symptom Score  The following is posted based on patient questionnaire answers:  0 - not at all    1-7 mild symptoms  1- Less than one time in five  8-19 moderate symptoms  2 -Less than half the time  20-35 severe symptoms  3 - About half the time  4 - More than half the time  5 - Almost always     For following sections:  Incomplete Emptying: - How often have you had the sensation  of not emptying your bladder completely after you finished urinating?  0  Frequency: -How often have you had to urinate again less than   two hours after you finished urinating?      0  Intermittency: -How often have you found you stopped and started again  Several times when you urinate?       0  Urgency: -How often do you find it difficult to postpone urination?             0  Weak stream: - How often have you had a weak urinary stream?             0  Straining: - How often  have you had to push or strain to begin  Urination?          0  Sleeping: -How many times did you most typically get up to urinate   From the time you went to bed at night until the time you got up in the   0  Morning          Total `  0    Quality of Life  How would you feel if you had to live with your urinary condition the way   0  It is now, no better, no worse for the rest of your life?    Where: 0=delighted; 1= pleased, 2= mostly satisfied, 3= mixed, 4 = mostly  Dissatisfied, 5= Unhappy, 6 = terrible    Assessment and Plan    Diagnoses and all orders for this visit:    History of prostate cancer  -     POC Urinalysis Dipstick, Multipro  -     PSA DIAGNOSTIC; Future    Erectile dysfunction after radical prostatectomy    Essential hypertension      Doing well MILLER s/p RALP.  Recommend continue Viagra for penile rehab.  Follow-up 3 months with PSA or sooner as needed.

## 2019-12-12 ENCOUNTER — OFFICE VISIT (OUTPATIENT)
Dept: UROLOGY | Facility: CLINIC | Age: 54
End: 2019-12-12

## 2019-12-12 VITALS — TEMPERATURE: 97.1 F | WEIGHT: 225 LBS | BODY MASS INDEX: 30.48 KG/M2 | HEIGHT: 72 IN

## 2019-12-12 DIAGNOSIS — Z85.46 HISTORY OF PROSTATE CANCER: Primary | ICD-10-CM

## 2019-12-12 DIAGNOSIS — I10 ESSENTIAL HYPERTENSION: ICD-10-CM

## 2019-12-12 DIAGNOSIS — N52.31 ERECTILE DYSFUNCTION AFTER RADICAL PROSTATECTOMY: ICD-10-CM

## 2019-12-12 LAB
BILIRUB BLD-MCNC: NEGATIVE MG/DL
CLARITY, POC: CLEAR
COLOR UR: YELLOW
GLUCOSE UR STRIP-MCNC: NEGATIVE MG/DL
KETONES UR QL: NEGATIVE
LEUKOCYTE EST, POC: NEGATIVE
NITRITE UR-MCNC: NEGATIVE MG/ML
PH UR: 5 [PH] (ref 5–8)
PROT UR STRIP-MCNC: NEGATIVE MG/DL
RBC # UR STRIP: NEGATIVE /UL
SP GR UR: 1.02 (ref 1–1.03)
UROBILINOGEN UR QL: NORMAL

## 2019-12-12 PROCEDURE — 99213 OFFICE O/P EST LOW 20 MIN: CPT | Performed by: UROLOGY

## 2019-12-12 PROCEDURE — 81003 URINALYSIS AUTO W/O SCOPE: CPT | Performed by: UROLOGY

## 2019-12-12 NOTE — PATIENT INSTRUCTIONS

## 2020-03-11 ENCOUNTER — RESULTS ENCOUNTER (OUTPATIENT)
Dept: UROLOGY | Facility: CLINIC | Age: 55
End: 2020-03-11

## 2020-03-11 DIAGNOSIS — Z85.46 HISTORY OF PROSTATE CANCER: ICD-10-CM

## 2020-03-12 LAB — PSA SERPL-MCNC: <0.014 NG/ML (ref 0–4)

## 2020-03-17 NOTE — PROGRESS NOTES
Subjective    Mr. Hendrix is 54 y.o. male    Chief Complaint: History of prostate cancer    History of Present Illness    54-year-old male follow-up for history of prostate cancer status post RALP by Dr. Vargas on 11/14/2018 for [id number] [place] 8 prostate cancer with negative margins.  He has worsening problem of post prostatectomy erectile dysfunction.  Context partial erections with sildenafil.  Timing PSA drawn 3/12/2020 remains undetectable.  Quality loss of function.  Associated symptoms he denies JOSE, hematuria, or bone pain. Onset 11/14/18.     Lab Results   Component Value Date    PSA <0.014 03/12/2020    PSA <0.014 12/05/2019    PSA <0.014 09/03/2019       The following portions of the patient's history were reviewed and updated as appropriate: allergies, current medications, past family history, past medical history, past social history, past surgical history and problem list.    Review of Systems   Constitutional: Negative for chills and fever.   Gastrointestinal: Negative for abdominal pain, anal bleeding and blood in stool.   Genitourinary: Negative for dysuria, frequency, hematuria and urgency.   All other systems reviewed and are negative.        Current Outpatient Medications:   •  metoprolol tartrate (LOPRESSOR) 50 MG tablet, TK 1 T PO BID, Disp: , Rfl: 11  •  sildenafil (VIAGRA) 100 MG tablet, Take 1 tablet by mouth Daily As Needed for erectile dysfunction., Disp: 15 tablet, Rfl: 11    Past Medical History:   Diagnosis Date   • Hypertension    • Prostate CA (CMS/HCC)        Past Surgical History:   Procedure Laterality Date   • HAND SURGERY     • KNEE SURGERY     • PROSTATECTOMY N/A 11/14/2018    Procedure: PROSTATECTOMY LAPAROSCOPIC WITH DAVINCI SI ROBOT, BILATERAL PELVIC LYMPH NODE DISSECTION;  Surgeon: Manfred Vargas MD;  Location: [facility] OR;  Service: DaVinci   • SHOULDER SURGERY         Social History     Socioeconomic History   • Marital status:      Spouse name: Not on file   •  Number of children: Not on file   • Years of education: Not on file   • Highest education level: Not on file   Tobacco Use   • Smoking status: Never Smoker   • Smokeless tobacco: Current User     Types: Snuff   • Tobacco comment: 1 CAN EVERY 2 DAYS   Substance and Sexual Activity   • Alcohol use: No   • Drug use: No   • Sexual activity: Defer       Family History   Problem Relation Age of Onset   • No Known Problems Father    • No Known Problems Mother        Objective    There were no vitals taken for this visit.    Physical Exam  Constitutional: Well nourished, Well developed; No apparent distress; Vital reviewed as above  Psychiatric: Appropriate affect; Alert and oriented  Eyes: Unremarkable  Musculoskeletal: Normal gait and station  GI: Abdomen is soft, non-tender  Respiratory: No distress; Unlabored movement; No accessory musculature needed with symmetric movements  Skin: No pallor or diaphoresis  Lymphatic: No adenopathy neck or groin      Results for orders placed or performed in visit on 03/11/20   PSA DIAGNOSTIC   Result Value Ref Range    PSA <0.014 0.000 - 4.000 ng/mL     Assessment and Plan    Diagnoses and all orders for this visit:    History of prostate cancer  -     POC Urinalysis Dipstick, Multipro  -     PSA DIAGNOSTIC; Future    Erectile dysfunction after radical prostatectomy  -     tadalafil (Cialis) 20 MG tablet; Take 1 tablet by mouth As Needed for Erectile Dysfunction.      Worsening post prostatectomy erectile dysfunction for which I recommended a trial of tadalafil in lieu of the sildenafil.  We also discussed other options for penile injection therapy, intraurethral gel, NAVA, and penile implant.  Doing well from a prostate cancer standpoint MILLER with undetectable PSA.  Follow-up with me in 6 months with pre-clinic PSA or sooner as needed.

## 2020-03-18 ENCOUNTER — OFFICE VISIT (OUTPATIENT)
Dept: UROLOGY | Facility: CLINIC | Age: 55
End: 2020-03-18

## 2020-03-18 VITALS — BODY MASS INDEX: 31.15 KG/M2 | WEIGHT: 230 LBS | TEMPERATURE: 97.6 F | HEIGHT: 72 IN

## 2020-03-18 DIAGNOSIS — N52.31 ERECTILE DYSFUNCTION AFTER RADICAL PROSTATECTOMY: ICD-10-CM

## 2020-03-18 DIAGNOSIS — Z85.46 HISTORY OF PROSTATE CANCER: Primary | ICD-10-CM

## 2020-03-18 PROCEDURE — 99213 OFFICE O/P EST LOW 20 MIN: CPT | Performed by: UROLOGY

## 2020-03-18 PROCEDURE — 81003 URINALYSIS AUTO W/O SCOPE: CPT | Performed by: UROLOGY

## 2020-03-18 RX ORDER — TADALAFIL 20 MG/1
20 TABLET ORAL AS NEEDED
Qty: 10 TABLET | Refills: 11 | Status: SHIPPED | OUTPATIENT
Start: 2020-03-18 | End: 2022-08-03 | Stop reason: ALTCHOICE

## 2020-03-18 NOTE — PATIENT INSTRUCTIONS

## 2020-09-04 ENCOUNTER — TELEPHONE (OUTPATIENT)
Dept: UROLOGY | Facility: CLINIC | Age: 55
End: 2020-09-04

## 2020-09-04 NOTE — TELEPHONE ENCOUNTER
I left a vm of patient's new appointment with Dr. Guerra. Appointment had to be rescheduled because Dr. Guerra will not be in clinic.

## 2020-09-10 DIAGNOSIS — Z85.46 HISTORY OF PROSTATE CANCER: ICD-10-CM

## 2020-09-10 LAB — PSA SERPL-MCNC: <0.014 NG/ML (ref 0–4)

## 2020-09-17 NOTE — PROGRESS NOTES
Subjective    Mr. Hendrix is 55 y.o. male    Chief Complaint: History of Prostate Cancer     History of Present Illness     55-year-old male follow-up for history of prostate cancer status post RALP by Dr. Vargas on 11/14/2018 for [id number] [place] 8 prostate cancer with negative margins.  He has worsening problem of post prostatectomy erectile dysfunction.  Context he was given tadalafil last visit but that gave him a terrible headache and he would like to go back to sildenafil. Timing PSA drawn 9/10/2020 remains undetectable.  Quality loss of function.  Associated symptoms he denies JOSE, hematuria, or bone pain. Onset 11/14/18.    Lab Results   Component Value Date    PSA <0.014 09/10/2020    PSA <0.014 03/12/2020    PSA <0.014 12/05/2019       The following portions of the patient's history were reviewed and updated as appropriate: allergies, current medications, past family history, past medical history, past social history, past surgical history and problem list.    Review of Systems   Constitutional: Negative for chills and fever.   Gastrointestinal: Negative for abdominal pain, anal bleeding and blood in stool.   Genitourinary: Negative for dysuria, frequency, hematuria and urgency.   All other systems reviewed and are negative.        Current Outpatient Medications:   •  metoprolol tartrate (LOPRESSOR) 50 MG tablet, TK 1 T PO BID, Disp: , Rfl: 11  •  sildenafil (VIAGRA) 100 MG tablet, Take 1 tablet by mouth Daily As Needed for erectile dysfunction., Disp: 15 tablet, Rfl: 11  •  tadalafil (Cialis) 20 MG tablet, Take 1 tablet by mouth As Needed for Erectile Dysfunction., Disp: 10 tablet, Rfl: 11    Past Medical History:   Diagnosis Date   • Hypertension    • Prostate CA (CMS/HCC)        Past Surgical History:   Procedure Laterality Date   • HAND SURGERY     • KNEE SURGERY     • PROSTATECTOMY N/A 11/14/2018    Procedure: PROSTATECTOMY LAPAROSCOPIC WITH DAVINCI SI ROBOT, BILATERAL PELVIC LYMPH NODE DISSECTION;   Surgeon: Manfred Vargas MD;  Location: [facility] OR;  Service: DaVinci   • SHOULDER SURGERY         Social History     Socioeconomic History   • Marital status:      Spouse name: Not on file   • Number of children: Not on file   • Years of education: Not on file   • Highest education level: Not on file   Tobacco Use   • Smoking status: Never Smoker   • Smokeless tobacco: Current User     Types: Snuff   • Tobacco comment: 1 CAN EVERY 2 DAYS   Substance and Sexual Activity   • Alcohol use: No   • Drug use: No   • Sexual activity: Defer       Family History   Problem Relation Age of Onset   • No Known Problems Father    • No Known Problems Mother        Objective    There were no vitals taken for this visit.    Physical Exam  Constitutional: Well nourished, Well developed; No apparent distress; Vital reviewed as above  Psychiatric: Appropriate affect; Alert and oriented  Eyes: Unremarkable  Musculoskeletal: Normal gait and station  GI: Abdomen is soft, non-tender  Respiratory: No distress; Unlabored movement; No accessory musculature needed with symmetric movements  Skin: No pallor or diaphoresis  Lymphatic: No adenopathy neck or groin      Results for orders placed or performed in visit on 09/10/20   PSA DIAGNOSTIC    Specimen: Blood   Result Value Ref Range    PSA <0.014 0.000 - 4.000 ng/mL     Assessment and Plan    Diagnoses and all orders for this visit:    History of prostate cancer  -     POC Urinalysis Dipstick, Multipro  -     PSA DIAGNOSTIC; Future    Erectile dysfunction, unspecified erectile dysfunction type  -     sildenafil (VIAGRA) 100 MG tablet; Take 1 tablet by mouth Daily As Needed for Erectile Dysfunction.    Essential hypertension          Worsening post prostatectomy erectile dysfunction.  Patient would like to go back to sildenafil.  We discussed other options for penile injections, intraurethral injections, NAVA, and penile implant.  He was given information today on penile implant.   Follow-up with me in 6 months with pre-clinic PSA or sooner as needed.       This document has been signed by DEEJAY Guerra MD on September 25, 2020 12:38 CDT

## 2020-09-25 ENCOUNTER — OFFICE VISIT (OUTPATIENT)
Dept: UROLOGY | Facility: CLINIC | Age: 55
End: 2020-09-25

## 2020-09-25 VITALS — BODY MASS INDEX: 33.35 KG/M2 | TEMPERATURE: 97.8 F | HEIGHT: 72 IN | WEIGHT: 246.2 LBS

## 2020-09-25 DIAGNOSIS — I10 ESSENTIAL HYPERTENSION: ICD-10-CM

## 2020-09-25 DIAGNOSIS — Z85.46 HISTORY OF PROSTATE CANCER: Primary | ICD-10-CM

## 2020-09-25 DIAGNOSIS — N52.9 ERECTILE DYSFUNCTION, UNSPECIFIED ERECTILE DYSFUNCTION TYPE: ICD-10-CM

## 2020-09-25 PROCEDURE — 99213 OFFICE O/P EST LOW 20 MIN: CPT | Performed by: UROLOGY

## 2020-09-25 PROCEDURE — 81001 URINALYSIS AUTO W/SCOPE: CPT | Performed by: UROLOGY

## 2020-09-25 RX ORDER — SILDENAFIL 100 MG/1
100 TABLET, FILM COATED ORAL DAILY PRN
Qty: 10 TABLET | Refills: 11 | Status: SHIPPED | OUTPATIENT
Start: 2020-09-25 | End: 2021-09-23 | Stop reason: SDUPTHER

## 2020-09-25 NOTE — PATIENT INSTRUCTIONS

## 2021-03-17 ENCOUNTER — CLINICAL SUPPORT (OUTPATIENT)
Dept: INTERNAL MEDICINE | Facility: CLINIC | Age: 56
End: 2021-03-17

## 2021-03-17 DIAGNOSIS — Z85.46 HISTORY OF PROSTATE CANCER: ICD-10-CM

## 2021-03-17 DIAGNOSIS — R97.20 ELEVATED PSA: Primary | ICD-10-CM

## 2021-03-17 PROCEDURE — 36415 COLL VENOUS BLD VENIPUNCTURE: CPT | Performed by: FAMILY MEDICINE

## 2021-03-19 LAB — PSA SERPL-MCNC: <0.1 NG/ML (ref 0–4)

## 2021-03-19 NOTE — PROGRESS NOTES
Subjective    Mr. Hendrix is 55 y.o. male    Chief Complaint: History of prostate cancer    History of Present Illness    55-year-old male follow-up for history of prostate cancer status post RALP by Dr. Vargas on 11/14/2018 for [id number] [place] 8 prostate cancer with negative margins.  His PSA remains undetectable.  He denies JOSE no pads.  He has not yet tried the sildenafil for his post prostatectomy ED.  Tadalafil gave him a terrible headache.      Lab Results   Component Value Date    PSA <0.1 03/17/2021    PSA <0.014 09/10/2020    PSA <0.014 03/12/2020       The following portions of the patient's history were reviewed and updated as appropriate: allergies, current medications, past family history, past medical history, past social history, past surgical history and problem list.    Review of Systems      Current Outpatient Medications:   •  metoprolol tartrate (LOPRESSOR) 50 MG tablet, TK 1 T PO BID, Disp: , Rfl: 11  •  sildenafil (VIAGRA) 100 MG tablet, Take 1 tablet by mouth Daily As Needed for Erectile Dysfunction., Disp: 10 tablet, Rfl: 11  •  tadalafil (Cialis) 20 MG tablet, Take 1 tablet by mouth As Needed for Erectile Dysfunction., Disp: 10 tablet, Rfl: 11    Past Medical History:   Diagnosis Date   • Hypertension    • Prostate CA (CMS/HCC)        Past Surgical History:   Procedure Laterality Date   • HAND SURGERY     • KNEE SURGERY     • PROSTATECTOMY N/A 11/14/2018    Procedure: PROSTATECTOMY LAPAROSCOPIC WITH DAVINCI SI ROBOT, BILATERAL PELVIC LYMPH NODE DISSECTION;  Surgeon: Manfred Vargas MD;  Location: [facility] OR;  Service: DaVinci   • SHOULDER SURGERY         Social History     Socioeconomic History   • Marital status:      Spouse name: Not on file   • Number of children: Not on file   • Years of education: Not on file   • Highest education level: Not on file   Tobacco Use   • Smoking status: Never Smoker   • Smokeless tobacco: Current User     Types: Snuff   • Tobacco comment: 1 CAN  EVERY 2 DAYS   Substance and Sexual Activity   • Alcohol use: No   • Drug use: No   • Sexual activity: Defer       Family History   Problem Relation Age of Onset   • No Known Problems Father    • No Known Problems Mother        Objective    There were no vitals taken for this visit.    Physical Exam  He is in no apparent distress.      Results for orders placed or performed in visit on 03/17/21   PSA DIAGNOSTIC ONLY    Specimen: Blood    BLOOD   Result Value Ref Range    PSA <0.1 0.0 - 4.0 ng/mL     Assessment and Plan    Diagnoses and all orders for this visit:    1. History of prostate cancer (Primary)  -     POC Urinalysis Dipstick, Multipro  -     PSA DIAGNOSTIC; Future    2. Essential hypertension    3. Erectile dysfunction after radical prostatectomy      Doing well MILLER with undetectable PSA.  We discussed other treatment options for his post prostatectomy erectile dysfunction including penile injections, NAVA, and penile implant.  He was given information on penile implant today.  He would like to continue PDE inhibitor therapy for now.  He will follow-up with me in September with preclinic PSA or sooner as needed.       This document has been signed by DEEJAY Guerra MD on March 26, 2021 12:42 CDT

## 2021-03-25 ENCOUNTER — OFFICE VISIT (OUTPATIENT)
Dept: UROLOGY | Facility: CLINIC | Age: 56
End: 2021-03-25

## 2021-03-25 VITALS — TEMPERATURE: 98.3 F | BODY MASS INDEX: 34.13 KG/M2 | HEIGHT: 72 IN | WEIGHT: 252 LBS

## 2021-03-25 DIAGNOSIS — I10 ESSENTIAL HYPERTENSION: ICD-10-CM

## 2021-03-25 DIAGNOSIS — Z85.46 HISTORY OF PROSTATE CANCER: Primary | ICD-10-CM

## 2021-03-25 DIAGNOSIS — N52.31 ERECTILE DYSFUNCTION AFTER RADICAL PROSTATECTOMY: ICD-10-CM

## 2021-03-25 LAB
BILIRUB BLD-MCNC: NEGATIVE MG/DL
CLARITY, POC: CLEAR
COLOR UR: YELLOW
GLUCOSE UR STRIP-MCNC: NEGATIVE MG/DL
KETONES UR QL: NEGATIVE
LEUKOCYTE EST, POC: NEGATIVE
NITRITE UR-MCNC: NEGATIVE MG/ML
PH UR: 5 [PH] (ref 5–8)
PROT UR STRIP-MCNC: NEGATIVE MG/DL
RBC # UR STRIP: ABNORMAL /UL
SP GR UR: 1.03 (ref 1–1.03)
UROBILINOGEN UR QL: NORMAL

## 2021-03-25 PROCEDURE — 99214 OFFICE O/P EST MOD 30 MIN: CPT | Performed by: UROLOGY

## 2021-03-25 PROCEDURE — 81003 URINALYSIS AUTO W/O SCOPE: CPT | Performed by: UROLOGY

## 2021-03-25 NOTE — PATIENT INSTRUCTIONS
"BMI for Adults  What is BMI?  Body mass index (BMI) is a number that is calculated from a person's weight and height. BMI can help estimate how much of a person's weight is composed of fat. BMI does not measure body fat directly. Rather, it is an alternative to procedures that directly measure body fat, which can be difficult and expensive.  BMI can help identify people who may be at higher risk for certain medical problems.  What are BMI measurements used for?  BMI is used as a screening tool to identify possible weight problems. It helps determine whether a person is obese, overweight, a healthy weight, or underweight.  BMI is useful for:  · Identifying a weight problem that may be related to a medical condition or may increase the risk for medical problems.  · Promoting changes, such as changes in diet and exercise, to help reach a healthy weight. BMI screening can be repeated to see if these changes are working.  How is BMI calculated?  BMI involves measuring your weight in relation to your height. Both height and weight are measured, and the BMI is calculated from those numbers. This can be done either in English (U.S.) or metric measurements. Note that charts and online BMI calculators are available to help you find your BMI quickly and easily without having to do these calculations yourself.  To calculate your BMI in English (U.S.) measurements:    1. Measure your weight in pounds (lb).  2. Multiply the number of pounds by 703.  ? For example, for a person who weighs 180 lb, multiply that number by 703, which equals 126,540.  3. Measure your height in inches. Then multiply that number by itself to get a measurement called \"inches squared.\"  ? For example, for a person who is 70 inches tall, the \"inches squared\" measurement is 70 inches x 70 inches, which equals 4,900 inches squared.  4. Divide the total from step 2 (number of lb x 703) by the total from step 3 (inches squared): 126,540 ÷ 4,900 = 25.8. This is " "your BMI.  To calculate your BMI in metric measurements:  1. Measure your weight in kilograms (kg).  2. Measure your height in meters (m). Then multiply that number by itself to get a measurement called \"meters squared.\"  ? For example, for a person who is 1.75 m tall, the \"meters squared\" measurement is 1.75 m x 1.75 m, which is equal to 3.1 meters squared.  3. Divide the number of kilograms (your weight) by the meters squared number. In this example: 70 ÷ 3.1 = 22.6. This is your BMI.  What do the results mean?  BMI charts are used to identify whether you are underweight, normal weight, overweight, or obese. The following guidelines will be used:  · Underweight: BMI less than 18.5.  · Normal weight: BMI between 18.5 and 24.9.  · Overweight: BMI between 25 and 29.9.  · Obese: BMI of 30 or above.  Keep these notes in mind:  · Weight includes both fat and muscle, so someone with a muscular build, such as an athlete, may have a BMI that is higher than 24.9. In cases like these, BMI is not an accurate measure of body fat.  · To determine if excess body fat is the cause of a BMI of 25 or higher, further assessments may need to be done by a health care provider.  · BMI is usually interpreted in the same way for men and women.  Where to find more information  For more information about BMI, including tools to quickly calculate your BMI, go to these websites:  · Centers for Disease Control and Prevention: www.cdc.gov  · American Heart Association: www.heart.org  · National Heart, Lung, and Blood [place]: www.nhlbi.nih.gov  Summary  · Body mass index (BMI) is a number that is calculated from a person's weight and height.  · BMI may help estimate how much of a person's weight is composed of fat. BMI can help identify those who may be at higher risk for certain medical problems.  · BMI can be measured using English measurements or metric measurements.  · BMI charts are used to identify whether you are underweight, normal " weight, overweight, or obese.  This information is not intended to replace advice given to you by your health care provider. Make sure you discuss any questions you have with your health care provider.  Document Revised: 09/09/2020 Document Reviewed: 07/17/2020  Elsevier Patient Education © 2021 Elsevier Inc.

## 2021-09-17 ENCOUNTER — CLINICAL SUPPORT (OUTPATIENT)
Dept: INTERNAL MEDICINE | Facility: CLINIC | Age: 56
End: 2021-09-17

## 2021-09-17 DIAGNOSIS — Z85.46 HISTORY OF PROSTATE CANCER: ICD-10-CM

## 2021-09-17 PROCEDURE — 36415 COLL VENOUS BLD VENIPUNCTURE: CPT | Performed by: NURSE PRACTITIONER

## 2021-09-18 LAB — PSA SERPL-MCNC: <0.1 NG/ML (ref 0–4)

## 2021-09-21 NOTE — PROGRESS NOTES
Subjective    Mr. Hendrix is 56 y.o. male    Chief Complaint: History of prostate Cancer     History of Present Illness    56-year-old male follow-up for history of prostate cancer status post RALP by Dr. Vargas on 11/14/2018 for [id number] [place] 8 prostate cancer with negative margins.  His PSA remains undetectable.  He denies JOSE no pads.  He has not yet tried the sildenafil for his post prostatectomy ED.  Tadalafil gave him a terrible headache.    Lab Results   Component Value Date    PSA <0.1 09/17/2021    PSA <0.1 03/17/2021    PSA <0.014 09/10/2020       The following portions of the patient's history were reviewed and updated as appropriate: allergies, current medications, past family history, past medical history, past social history, past surgical history and problem list.    Review of Systems      Current Outpatient Medications:   •  metoprolol tartrate (LOPRESSOR) 50 MG tablet, TK 1 T PO BID, Disp: , Rfl: 11  •  sildenafil (VIAGRA) 100 MG tablet, Take 1 tablet by mouth Daily As Needed for Erectile Dysfunction., Disp: 10 tablet, Rfl: 11  •  tadalafil (Cialis) 20 MG tablet, Take 1 tablet by mouth As Needed for Erectile Dysfunction., Disp: 10 tablet, Rfl: 11    Past Medical History:   Diagnosis Date   • Hypertension    • Prostate CA (CMS/HCC)        Past Surgical History:   Procedure Laterality Date   • HAND SURGERY     • KNEE SURGERY     • PROSTATECTOMY N/A 11/14/2018    Procedure: PROSTATECTOMY LAPAROSCOPIC WITH DAVINCI SI ROBOT, BILATERAL PELVIC LYMPH NODE DISSECTION;  Surgeon: Manfred Vargas MD;  Location: [facility] OR;  Service: DaVinci   • SHOULDER SURGERY         Social History     Socioeconomic History   • Marital status:      Spouse name: Not on file   • Number of children: Not on file   • Years of education: Not on file   • Highest education level: Not on file   Tobacco Use   • Smoking status: Never Smoker   • Smokeless tobacco: Current User     Types: Snuff   • Tobacco comment: 1 CAN EVERY  "2 DAYS   Substance and Sexual Activity   • Alcohol use: No   • Drug use: No   • Sexual activity: Defer       Family History   Problem Relation Age of Onset   • No Known Problems Father    • No Known Problems Mother        Objective    Temp 98.2 °F (36.8 °C)   Ht 182.9 cm (72\")   Wt 114 kg (250 lb 6.4 oz)   BMI 33.96 kg/m²     Physical Exam        Results for orders placed or performed in visit on 09/23/21   POC Urinalysis Dipstick, Multipro    Specimen: Urine   Result Value Ref Range    Color Yellow Yellow, Straw, Dark Yellow, Nikki    Clarity, UA Clear Clear    Glucose, UA Negative Negative, 1000 mg/dL (3+) mg/dL    Bilirubin Negative Negative    Ketones, UA Negative Negative    Specific Gravity  1.025 1.005 - 1.030    Blood, UA Trace (A) Negative    pH, Urine 5.5 5.0 - 8.0    Protein, POC Negative Negative mg/dL    Urobilinogen, UA Normal Normal    Nitrite, UA Negative Negative    Leukocytes Negative Negative     Assessment and Plan    Diagnoses and all orders for this visit:    1. History of prostate cancer (Primary)  -     POC Urinalysis Dipstick, Multipro  -     PSA DIAGNOSTIC; Future    2. Erectile dysfunction, unspecified erectile dysfunction type  -     sildenafil (VIAGRA) 100 MG tablet; Take 1 tablet by mouth Daily As Needed for Erectile Dysfunction.  Dispense: 10 tablet; Refill: 11      Doing well MILLER with undetectable PSA.    Continue sildenafil for his ED.  He will see me in March my [place] clinic with preclinic PSA or sooner as needed.      This document has been signed by DEEJAY Guerra MD on September 26, 2021 21:37 CDT              "

## 2021-09-23 ENCOUNTER — OFFICE VISIT (OUTPATIENT)
Dept: UROLOGY | Facility: CLINIC | Age: 56
End: 2021-09-23

## 2021-09-23 VITALS — WEIGHT: 250.4 LBS | TEMPERATURE: 98.2 F | BODY MASS INDEX: 33.92 KG/M2 | HEIGHT: 72 IN

## 2021-09-23 DIAGNOSIS — I10 ESSENTIAL HYPERTENSION: ICD-10-CM

## 2021-09-23 DIAGNOSIS — Z85.46 HISTORY OF PROSTATE CANCER: Primary | ICD-10-CM

## 2021-09-23 DIAGNOSIS — N52.9 ERECTILE DYSFUNCTION, UNSPECIFIED ERECTILE DYSFUNCTION TYPE: ICD-10-CM

## 2021-09-23 DIAGNOSIS — N52.31 ERECTILE DYSFUNCTION AFTER RADICAL PROSTATECTOMY: ICD-10-CM

## 2021-09-23 LAB
BILIRUB BLD-MCNC: NEGATIVE MG/DL
CLARITY, POC: CLEAR
COLOR UR: YELLOW
GLUCOSE UR STRIP-MCNC: NEGATIVE MG/DL
KETONES UR QL: NEGATIVE
LEUKOCYTE EST, POC: NEGATIVE
NITRITE UR-MCNC: NEGATIVE MG/ML
PH UR: 5.5 [PH] (ref 5–8)
PROT UR STRIP-MCNC: NEGATIVE MG/DL
RBC # UR STRIP: ABNORMAL /UL
SP GR UR: 1.02 (ref 1–1.03)
UROBILINOGEN UR QL: NORMAL

## 2021-09-23 PROCEDURE — 99213 OFFICE O/P EST LOW 20 MIN: CPT | Performed by: UROLOGY

## 2021-09-23 PROCEDURE — 81001 URINALYSIS AUTO W/SCOPE: CPT | Performed by: UROLOGY

## 2021-09-23 RX ORDER — SILDENAFIL 100 MG/1
100 TABLET, FILM COATED ORAL DAILY PRN
Qty: 10 TABLET | Refills: 11 | Status: SHIPPED | OUTPATIENT
Start: 2021-09-23 | End: 2022-09-22 | Stop reason: SDUPTHER

## 2021-09-23 NOTE — PATIENT INSTRUCTIONS
"BMI for Adults  What is BMI?  Body mass index (BMI) is a number that is calculated from a person's weight and height. BMI can help estimate how much of a person's weight is composed of fat. BMI does not measure body fat directly. Rather, it is an alternative to procedures that directly measure body fat, which can be difficult and expensive.  BMI can help identify people who may be at higher risk for certain medical problems.  What are BMI measurements used for?  BMI is used as a screening tool to identify possible weight problems. It helps determine whether a person is obese, overweight, a healthy weight, or underweight.  BMI is useful for:  · Identifying a weight problem that may be related to a medical condition or may increase the risk for medical problems.  · Promoting changes, such as changes in diet and exercise, to help reach a healthy weight. BMI screening can be repeated to see if these changes are working.  How is BMI calculated?  BMI involves measuring your weight in relation to your height. Both height and weight are measured, and the BMI is calculated from those numbers. This can be done either in English (U.S.) or metric measurements. Note that charts and online BMI calculators are available to help you find your BMI quickly and easily without having to do these calculations yourself.  To calculate your BMI in English (U.S.) measurements:    1. Measure your weight in pounds (lb).  2. Multiply the number of pounds by 703.  ? For example, for a person who weighs 180 lb, multiply that number by 703, which equals 126,540.  3. Measure your height in inches. Then multiply that number by itself to get a measurement called \"inches squared.\"  ? For example, for a person who is 70 inches tall, the \"inches squared\" measurement is 70 inches x 70 inches, which equals 4,900 inches squared.  4. Divide the total from step 2 (number of lb x 703) by the total from step 3 (inches squared): 126,540 ÷ 4,900 = 25.8. This is " "your BMI.    To calculate your BMI in metric measurements:  1. Measure your weight in kilograms (kg).  2. Measure your height in meters (m). Then multiply that number by itself to get a measurement called \"meters squared.\"  ? For example, for a person who is 1.75 m tall, the \"meters squared\" measurement is 1.75 m x 1.75 m, which is equal to 3.1 meters squared.  3. Divide the number of kilograms (your weight) by the meters squared number. In this example: 70 ÷ 3.1 = 22.6. This is your BMI.  What do the results mean?  BMI charts are used to identify whether you are underweight, normal weight, overweight, or obese. The following guidelines will be used:  · Underweight: BMI less than 18.5.  · Normal weight: BMI between 18.5 and 24.9.  · Overweight: BMI between 25 and 29.9.  · Obese: BMI of 30 or above.  Keep these notes in mind:  · Weight includes both fat and muscle, so someone with a muscular build, such as an athlete, may have a BMI that is higher than 24.9. In cases like these, BMI is not an accurate measure of body fat.  · To determine if excess body fat is the cause of a BMI of 25 or higher, further assessments may need to be done by a health care provider.  · BMI is usually interpreted in the same way for men and women.  Where to find more information  For more information about BMI, including tools to quickly calculate your BMI, go to these websites:  · Centers for Disease Control and Prevention: www.cdc.gov  · American Heart Association: www.heart.org  · National Heart, Lung, and Blood [place]: www.nhlbi.nih.gov  Summary  · Body mass index (BMI) is a number that is calculated from a person's weight and height.  · BMI may help estimate how much of a person's weight is composed of fat. BMI can help identify those who may be at higher risk for certain medical problems.  · BMI can be measured using English measurements or metric measurements.  · BMI charts are used to identify whether you are underweight, normal " weight, overweight, or obese.  This information is not intended to replace advice given to you by your health care provider. Make sure you discuss any questions you have with your health care provider.  Document Revised: 09/09/2020 Document Reviewed: 07/17/2020  Elsevier Patient Education © 2021 Elsevier Inc.

## 2022-03-14 ENCOUNTER — CLINICAL SUPPORT (OUTPATIENT)
Dept: INTERNAL MEDICINE | Facility: CLINIC | Age: 57
End: 2022-03-14

## 2022-03-14 DIAGNOSIS — Z85.46 HISTORY OF PROSTATE CANCER: ICD-10-CM

## 2022-03-14 PROCEDURE — 36415 COLL VENOUS BLD VENIPUNCTURE: CPT | Performed by: NURSE PRACTITIONER

## 2022-03-14 NOTE — PROGRESS NOTES
Venipuncture Blood Specimen Collection  Venipuncture performed in Left arm by Briana Benson CMA with good hemostasis. Patient tolerated the procedure well without complications.   03/14/22   Briana Benson CMA

## 2022-03-15 NOTE — PROGRESS NOTES
Subjective    Mr. Hendrix is 56 y.o. male    Chief Complaint: History of prostate cancer    History of Present Illness    56-year-old male established patient follow-up for history of prostate cancer status post RALP by Dr. Vargas on 11/14/2018 for [id number] [place] 8 prostate cancer with negative margins.  His PSA remains undetectable.  He denies JOSE no pads.  Sildenafil working well for his ED.  Tadalafil gave him a terrible headache.  He is needing a referral to hand orthopedic surgeon for trigger fingers.    Lab Results   Component Value Date    PSA <0.1 03/14/2022    PSA <0.1 09/17/2021    PSA <0.1 03/17/2021       The following portions of the patient's history were reviewed and updated as appropriate: allergies, current medications, past family history, past medical history, past social history, past surgical history and problem list.    Review of Systems      Current Outpatient Medications:   •  metoprolol tartrate (LOPRESSOR) 50 MG tablet, TK 1 T PO BID, Disp: , Rfl: 11  •  sildenafil (VIAGRA) 100 MG tablet, Take 1 tablet by mouth Daily As Needed for Erectile Dysfunction., Disp: 10 tablet, Rfl: 11  •  tadalafil (Cialis) 20 MG tablet, Take 1 tablet by mouth As Needed for Erectile Dysfunction., Disp: 10 tablet, Rfl: 11    Past Medical History:   Diagnosis Date   • Hypertension    • Prostate CA (HCC)        Past Surgical History:   Procedure Laterality Date   • HAND SURGERY     • KNEE SURGERY     • PROSTATECTOMY N/A 11/14/2018    Procedure: PROSTATECTOMY LAPAROSCOPIC WITH DAVINCI SI ROBOT, BILATERAL PELVIC LYMPH NODE DISSECTION;  Surgeon: Manfred Vargas MD;  Location: [facility] OR;  Service: DaVinci   • SHOULDER SURGERY         Social History     Socioeconomic History   • Marital status:    Tobacco Use   • Smoking status: Never Smoker   • Smokeless tobacco: Current User     Types: Snuff   • Tobacco comment: 1 CAN EVERY 2 DAYS   Substance and Sexual Activity   • Alcohol use: No   • Drug use: No   •  "Sexual activity: Defer       Family History   Problem Relation Age of Onset   • No Known Problems Father    • No Known Problems Mother        Objective    Temp 98.2 °F (36.8 °C)   Ht 182.9 cm (72\")   Wt 104 kg (230 lb)   BMI 31.19 kg/m²     Physical Exam        Results for orders placed or performed in visit on 03/24/22   POC Urinalysis Dipstick, Multipro    Specimen: Urine   Result Value Ref Range    Color Yellow Yellow, Straw, Dark Yellow, Nikki    Clarity, UA Clear Clear    Glucose, UA Negative Negative, 1000 mg/dL (3+) mg/dL    Bilirubin Negative Negative    Ketones, UA Negative Negative    Specific Gravity  1.030 1.005 - 1.030    Blood, UA Trace (A) Negative    pH, Urine 5.5 5.0 - 8.0    Protein, POC Negative Negative mg/dL    Urobilinogen, UA Normal Normal    Nitrite, UA Negative Negative    Leukocytes Negative Negative     Assessment and Plan    Diagnoses and all orders for this visit:    1. History of prostate cancer (Primary)  -     POC Urinalysis Dipstick, Multipro  -     PSA DIAGNOSTIC; Future    2. Contracture of joint, hand, left  -     Ambulatory Referral to Orthopedic Surgery      Doing well MILLER with undetectable PSA.    Continue sildenafil for his ED.  He will [facility] me in [facility] and [facility] with preclinic PSA or sooner as needed      This document has been signed by DEEJAY Guerra MD on March 26, 2022 22:33 CDT                "

## 2022-03-16 LAB — PSA SERPL-MCNC: <0.1 NG/ML (ref 0–4)

## 2022-03-24 ENCOUNTER — OFFICE VISIT (OUTPATIENT)
Dept: UROLOGY | Facility: CLINIC | Age: 57
End: 2022-03-24

## 2022-03-24 VITALS — HEIGHT: 72 IN | WEIGHT: 230 LBS | BODY MASS INDEX: 31.15 KG/M2 | TEMPERATURE: 98.2 F

## 2022-03-24 DIAGNOSIS — M24.542 CONTRACTURE OF JOINT, HAND, LEFT: ICD-10-CM

## 2022-03-24 DIAGNOSIS — Z85.46 HISTORY OF PROSTATE CANCER: Primary | ICD-10-CM

## 2022-03-24 LAB
BILIRUB BLD-MCNC: NEGATIVE MG/DL
CLARITY, POC: CLEAR
COLOR UR: YELLOW
GLUCOSE UR STRIP-MCNC: NEGATIVE MG/DL
KETONES UR QL: NEGATIVE
LEUKOCYTE EST, POC: NEGATIVE
NITRITE UR-MCNC: NEGATIVE MG/ML
PH UR: 5.5 [PH] (ref 5–8)
PROT UR STRIP-MCNC: NEGATIVE MG/DL
RBC # UR STRIP: ABNORMAL /UL
SP GR UR: 1.03 (ref 1–1.03)
UROBILINOGEN UR QL: NORMAL

## 2022-03-24 PROCEDURE — 81001 URINALYSIS AUTO W/SCOPE: CPT | Performed by: UROLOGY

## 2022-03-24 PROCEDURE — 99213 OFFICE O/P EST LOW 20 MIN: CPT | Performed by: UROLOGY

## 2022-08-03 ENCOUNTER — PATIENT ROUNDING (BHMG ONLY) (OUTPATIENT)
Dept: INTERNAL MEDICINE | Facility: CLINIC | Age: 57
End: 2022-08-03

## 2022-08-03 ENCOUNTER — OFFICE VISIT (OUTPATIENT)
Dept: INTERNAL MEDICINE | Facility: CLINIC | Age: 57
End: 2022-08-03

## 2022-08-03 VITALS
BODY MASS INDEX: 34.13 KG/M2 | OXYGEN SATURATION: 99 % | RESPIRATION RATE: 17 BRPM | DIASTOLIC BLOOD PRESSURE: 90 MMHG | TEMPERATURE: 98 F | SYSTOLIC BLOOD PRESSURE: 152 MMHG | WEIGHT: 252 LBS | HEIGHT: 72 IN | HEART RATE: 66 BPM

## 2022-08-03 DIAGNOSIS — Z00.00 ROUTINE GENERAL MEDICAL EXAMINATION AT A HEALTH CARE FACILITY: Primary | ICD-10-CM

## 2022-08-03 DIAGNOSIS — R53.83 FATIGUE, UNSPECIFIED TYPE: ICD-10-CM

## 2022-08-03 DIAGNOSIS — I10 PRIMARY HYPERTENSION: ICD-10-CM

## 2022-08-03 PROCEDURE — 99214 OFFICE O/P EST MOD 30 MIN: CPT

## 2022-08-03 RX ORDER — METOPROLOL TARTRATE 50 MG/1
50 TABLET, FILM COATED ORAL 2 TIMES DAILY
Qty: 120 TABLET | Refills: 1 | Status: SHIPPED | OUTPATIENT
Start: 2022-08-03 | End: 2022-12-02 | Stop reason: SDUPTHER

## 2022-08-03 NOTE — PROGRESS NOTES
"        Subjective     Chief Complaint   Patient presents with   • Hypertension     Establishing care.        History of Present Illness  Patient presents today to establish care. Has past medical history of hypertension, prostate cancer, and  erectile dysfunction. Had prostatectomy approximately 5 years ago. Currently on Viagra and metoprolol 50 bid. Patient sees Dr. Guerra for urology. Works for [employer]. States BP waxes and wanes. Today is elevated at 152/90 but reports \"this is usually the highest it is.\"   States that he will start checking his BP daily at home and bring log to follow up visit.   Denies any chest pain, shortness of breath, or headaches.   Is going to see Dr. Antoine for the fingers on his left hand. States is supposed to have a proceed that includes an injection to release his fingers.     Patient's PMR from outside medical facility reviewed and noted.    Review of Systems   Constitutional: Negative for activity change, fatigue and unexpected weight change.   HENT: Negative for mouth sores and trouble swallowing.    Eyes: Negative for discharge and visual disturbance.   Respiratory: Negative for cough and shortness of breath.    Cardiovascular: Negative for chest pain and leg swelling.   Gastrointestinal: Negative for abdominal pain, constipation, diarrhea and nausea.   Genitourinary: Negative for decreased urine volume, difficulty urinating and hematuria.   Musculoskeletal: Negative for back pain and gait problem.        \"cant straighten out left middle finger, ring finger, and pinky finger.   Skin: Negative for color change and rash.   Allergic/Immunologic: Negative for environmental allergies and immunocompromised state.   Neurological: Negative for weakness and headaches.   Psychiatric/Behavioral: Negative for confusion and sleep disturbance.        Otherwise complete ROS reviewed and negative except as mentioned in the HPI.    Past Medical History:   Past Medical History:   Diagnosis Date   • " Hypertension    • Prostate CA (HCC)      Past Surgical History:  Past Surgical History:   Procedure Laterality Date   • HAND SURGERY     • KNEE SURGERY     • PROSTATECTOMY N/A 11/14/2018    Procedure: PROSTATECTOMY LAPAROSCOPIC WITH DAVINCI SI ROBOT, BILATERAL PELVIC LYMPH NODE DISSECTION;  Surgeon: Manfred Vargas MD;  Location: [facility];  Service: DaVinci   • SHOULDER SURGERY       Social History:  reports that he has never smoked. His smokeless tobacco use includes snuff. He reports that he does not drink alcohol and does not use drugs.    Family History: family history includes Heart disease in his father; No Known Problems in his mother.      Allergies:  No Known Allergies  Medications:  Prior to Admission medications    Medication Sig Start Date End Date Taking? Authorizing Provider   metoprolol tartrate (LOPRESSOR) 50 MG tablet TK 1 T PO BID 12/2/18  Yes Provider, MD Ruchi   sildenafil (VIAGRA) 100 MG tablet Take 1 tablet by mouth Daily As Needed for Erectile Dysfunction. 9/23/21  Yes Simon Guerra MD   tadalafil (Cialis) 20 MG tablet Take 1 tablet by mouth As Needed for Erectile Dysfunction. 3/18/20   Simon Guerra MD       NERI:        PHQ-9 Depression Screening  Little interest or pleasure in doing things? 0-->not at all   Feeling down, depressed, or hopeless? 0-->not at all   Trouble falling or staying asleep, or sleeping too much?     Feeling tired or having little energy?     Poor appetite or overeating?     Feeling bad about yourself - or that you are a failure or have let yourself or your family down?     Trouble concentrating on things, such as reading the newspaper or watching television?     Moving or speaking so slowly that other people could have noticed? Or the opposite - being so fidgety or restless that you have been moving around a lot more than usual?     Thoughts that you would be better off dead, or of hurting yourself in some way?     PHQ-9 Total Score 0   If you  "checked off any problems, how difficult have these problems made it for you to do your work, take care of things at home, or get along with other people?         PHQ-9 Total Score: 0   0 (Negative screening for depression)      Objective     Vital Signs: /90 (BP Location: Left arm, Patient Position: Sitting, Cuff Size: Adult)   Pulse 66   Temp 98 °F (36.7 °C) (Skin)   Resp 17   Ht 182.9 cm (72\")   Wt 114 kg (252 lb)   SpO2 99%   BMI 34.18 kg/m²   Physical Exam  Constitutional:       General: He is not in acute distress.     Appearance: Normal appearance. He is not ill-appearing.   HENT:      Head: Normocephalic and atraumatic.      Right Ear: External ear normal.      Left Ear: External ear normal.      Nose: Nose normal.      Mouth/Throat:      Mouth: Mucous membranes are moist.      Pharynx: No posterior oropharyngeal erythema.   Eyes:      General: No scleral icterus.     Extraocular Movements: Extraocular movements intact.      Conjunctiva/sclera: Conjunctivae normal.      Pupils: Pupils are equal, round, and reactive to light.   Cardiovascular:      Rate and Rhythm: Normal rate and regular rhythm.      Pulses: Normal pulses.      Heart sounds: Normal heart sounds.   Pulmonary:      Effort: Pulmonary effort is normal. No respiratory distress.      Breath sounds: Normal breath sounds. No wheezing.   Abdominal:      General: Abdomen is flat. Bowel sounds are normal.      Palpations: Abdomen is soft.      Tenderness: There is no abdominal tenderness.   Musculoskeletal:         General: Normal range of motion.      Cervical back: Normal range of motion and neck supple.      Right lower leg: No edema.      Left lower leg: No edema.      Comments: Contractures noted to left middle finger, ring finger, and pinky finger.    Skin:     General: Skin is warm and dry.      Capillary Refill: Capillary refill takes less than 2 seconds.      Findings: No erythema or rash.   Neurological:      General: No focal " deficit present.      Mental Status: He is alert and oriented to person, place, and time. Mental status is at baseline.      Motor: No weakness.   Psychiatric:         Mood and Affect: Mood normal.         Behavior: Behavior normal.         Thought Content: Thought content normal.         Judgment: Judgment normal.         BMI is >= 30 and <35. (Class 1 Obesity). The following options were offered after discussion;: exercise counseling/recommendations and nutrition counseling/recommendations    Results Reviewed:  Glucose   Date Value Ref Range Status   11/15/2018 111 (H) 70 - 100 mg/dL Final     BUN   Date Value Ref Range Status   11/15/2018 14 5 - 21 mg/dL Final     Creatinine   Date Value Ref Range Status   11/15/2018 1.13 0.50 - 1.40 mg/dL Final   10/09/2018 1.20 0.60 - 1.30 mg/dL Final     Comment:     Serial Number: [id number]:  [id number]     Sodium   Date Value Ref Range Status   11/15/2018 138 135 - 145 mmol/L Final     Potassium   Date Value Ref Range Status   11/15/2018 4.4 3.5 - 5.3 mmol/L Final     Chloride   Date Value Ref Range Status   11/15/2018 101 98 - 110 mmol/L Final     CO2   Date Value Ref Range Status   11/15/2018 29.0 24.0 - 31.0 mmol/L Final     Calcium   Date Value Ref Range Status   11/15/2018 8.0 (L) 8.4 - 10.4 mg/dL Final     WBC   Date Value Ref Range Status   11/15/2018 10.33 4.80 - 10.80 10*3/mm3 Final     Hematocrit   Date Value Ref Range Status   11/15/2018 38.3 (L) 40.0 - 52.0 % Final     Platelets   Date Value Ref Range Status   11/15/2018 183 130 - 400 10*3/mm3 Final         Assessment / Plan     Assessment/Plan:  1. Routine general medical examination at a health care facility  - CBC w AUTO Differential  - Comprehensive metabolic panel  - Lipid panel  - Testosterone  - T4  - TSH    2. Fatigue, unspecified type  - Vitamin D 25 hydroxy  - Vitamin B12    3. Primary hypertension  -currently on metoprolol 50 mg bid  -BP slightly elevated today; advised to monitor BP at home and bring  BP log to office in 1 month.       Return in about 3 months (around 11/3/2022) for HTN recheck. unless patient needs to be seen sooner or acute issues arise.      I have discussed the patient results/orders and and plan/recommendation with them at today's visit.      Margaret Gamboa, APRN   08/03/2022

## 2022-08-03 NOTE — PROGRESS NOTES
"August 3, 2022    Hello, may I speak with Caden Hendrix?    My name is Mendel Cespedes.     I am  with [facility]  [address] [place] [address] [phone].    Before we get started may I verify your date of birth? 1965    I am calling to officially welcome you to our practice and ask about your recent visit. Is this a good time to talk? YES    Tell me about your visit with us. What things went well?  \"Everything went well.\"        We're always looking for ways to make our patients' experiences even better. Do you have recommendations on ways we may improve?  NO    Overall were you satisfied with your first visit to our practice? YES     I appreciate you taking the time to speak with me today. Is there anything else I can do for you? NO      Thank you, and have a great day.      "

## 2022-08-05 DIAGNOSIS — E78.2 MIXED HYPERLIPIDEMIA: Primary | ICD-10-CM

## 2022-08-05 DIAGNOSIS — E55.9 HYPOVITAMINOSIS D: ICD-10-CM

## 2022-08-05 LAB
25(OH)D3+25(OH)D2 SERPL-MCNC: 24.3 NG/ML (ref 30–100)
ALBUMIN SERPL-MCNC: 4.2 G/DL (ref 3.8–4.9)
ALBUMIN/GLOB SERPL: 1.4 {RATIO} (ref 1.2–2.2)
ALP SERPL-CCNC: 80 IU/L (ref 44–121)
ALT SERPL-CCNC: 68 IU/L (ref 0–44)
AST SERPL-CCNC: 46 IU/L (ref 0–40)
BASOPHILS # BLD AUTO: 0.1 X10E3/UL (ref 0–0.2)
BASOPHILS NFR BLD AUTO: 1 %
BILIRUB SERPL-MCNC: 0.7 MG/DL (ref 0–1.2)
BUN SERPL-MCNC: 12 MG/DL (ref 6–24)
BUN/CREAT SERPL: 11 (ref 9–20)
CALCIUM SERPL-MCNC: 9.3 MG/DL (ref 8.7–10.2)
CHLORIDE SERPL-SCNC: 100 MMOL/L (ref 96–106)
CHOLEST SERPL-MCNC: 212 MG/DL (ref 100–199)
CO2 SERPL-SCNC: 21 MMOL/L (ref 20–29)
CREAT SERPL-MCNC: 1.07 MG/DL (ref 0.76–1.27)
EGFRCR SERPLBLD CKD-EPI 2021: 80 ML/MIN/1.73
EOSINOPHIL # BLD AUTO: 0.9 X10E3/UL (ref 0–0.4)
EOSINOPHIL NFR BLD AUTO: 8 %
ERYTHROCYTE [DISTWIDTH] IN BLOOD BY AUTOMATED COUNT: 13.4 % (ref 11.6–15.4)
GLOBULIN SER CALC-MCNC: 3.1 G/DL (ref 1.5–4.5)
GLUCOSE SERPL-MCNC: 90 MG/DL (ref 65–99)
HCT VFR BLD AUTO: 50.2 % (ref 37.5–51)
HDLC SERPL-MCNC: 30 MG/DL
HGB BLD-MCNC: 16.9 G/DL (ref 13–17.7)
IMM GRANULOCYTES # BLD AUTO: 0.1 X10E3/UL (ref 0–0.1)
IMM GRANULOCYTES NFR BLD AUTO: 1 %
LDLC SERPL CALC-MCNC: 120 MG/DL (ref 0–99)
LYMPHOCYTES # BLD AUTO: 2.6 X10E3/UL (ref 0.7–3.1)
LYMPHOCYTES NFR BLD AUTO: 25 %
MCH RBC QN AUTO: 32.6 PG (ref 26.6–33)
MCHC RBC AUTO-ENTMCNC: 33.7 G/DL (ref 31.5–35.7)
MCV RBC AUTO: 97 FL (ref 79–97)
MONOCYTES # BLD AUTO: 0.9 X10E3/UL (ref 0.1–0.9)
MONOCYTES NFR BLD AUTO: 8 %
NEUTROPHILS # BLD AUTO: 6.2 X10E3/UL (ref 1.4–7)
NEUTROPHILS NFR BLD AUTO: 57 %
PLATELET # BLD AUTO: 243 X10E3/UL (ref 150–450)
POTASSIUM SERPL-SCNC: 4.5 MMOL/L (ref 3.5–5.2)
PROT SERPL-MCNC: 7.3 G/DL (ref 6–8.5)
RBC # BLD AUTO: 5.18 X10E6/UL (ref 4.14–5.8)
SODIUM SERPL-SCNC: 138 MMOL/L (ref 134–144)
T4 SERPL-MCNC: 7 UG/DL (ref 4.5–12)
TESTOST SERPL-MCNC: 455 NG/DL (ref 264–916)
TRIGL SERPL-MCNC: 349 MG/DL (ref 0–149)
TSH SERPL DL<=0.005 MIU/L-ACNC: 1.82 UIU/ML (ref 0.45–4.5)
VIT B12 SERPL-MCNC: 376 PG/ML (ref 232–1245)
VLDLC SERPL CALC-MCNC: 62 MG/DL (ref 5–40)
WBC # BLD AUTO: 10.7 X10E3/UL (ref 3.4–10.8)

## 2022-08-05 RX ORDER — ERGOCALCIFEROL 1.25 MG/1
50000 CAPSULE ORAL WEEKLY
Qty: 5 CAPSULE | Refills: 1 | Status: SHIPPED | OUTPATIENT
Start: 2022-08-05 | End: 2022-10-12 | Stop reason: SDUPTHER

## 2022-08-05 RX ORDER — CHLORAL HYDRATE 500 MG
3000 CAPSULE ORAL
Qty: 90 CAPSULE | Refills: 1 | Status: SHIPPED | OUTPATIENT
Start: 2022-08-05

## 2022-08-05 RX ORDER — ROSUVASTATIN CALCIUM 10 MG/1
5 TABLET, COATED ORAL DAILY
Qty: 90 TABLET | Refills: 1 | Status: SHIPPED | OUTPATIENT
Start: 2022-08-05 | End: 2022-08-15 | Stop reason: SINTOL

## 2022-08-05 NOTE — PROGRESS NOTES
CBC is ok. No concerns. CMP is ok. Liver enzymes are slightly elevated nothing super concerning, but will recheck at follow up. Cholestrol is elevated and triglycerides are as well. Would like to start on crestor and omega 3 if ok. Needs vitamin D replaced, can send in weekly pill. Vitamin d will help with fatigue.

## 2022-08-05 NOTE — PROGRESS NOTES
Called pt with results of labs. Pt voiced understanding. Pt is okay with all medication recommendations. Pt would like meds sent in to [facility] in [place].

## 2022-08-12 ENCOUNTER — TELEPHONE (OUTPATIENT)
Dept: INTERNAL MEDICINE | Facility: CLINIC | Age: 57
End: 2022-08-12

## 2022-08-12 NOTE — TELEPHONE ENCOUNTER
Pt called and stated you wanted to start pt on cholesterol medication and he has been having headaches in the afternoon and BP has been rising. Systolic has been in the 140s. Diastolic has been around 80. Per Margaret let pt know to stop Crestor and let him know that she is going to call pt in a lower strength statin. Pt is going to keep track of BP and symptoms. Let pt know that if symptoms progress to call us or go to the ER to be evaluated. Pt voiced understanding.

## 2022-08-15 ENCOUNTER — DOCUMENTATION (OUTPATIENT)
Dept: INTERNAL MEDICINE | Facility: CLINIC | Age: 57
End: 2022-08-15

## 2022-08-15 RX ORDER — SIMVASTATIN 10 MG
10 TABLET ORAL NIGHTLY
Qty: 90 TABLET | Refills: 1 | Status: SHIPPED | OUTPATIENT
Start: 2022-08-15 | End: 2023-02-14 | Stop reason: SDUPTHER

## 2022-09-16 ENCOUNTER — CLINICAL SUPPORT (OUTPATIENT)
Dept: INTERNAL MEDICINE | Facility: CLINIC | Age: 57
End: 2022-09-16

## 2022-09-16 DIAGNOSIS — Z85.46 HISTORY OF PROSTATE CANCER: ICD-10-CM

## 2022-09-16 PROCEDURE — 36415 COLL VENOUS BLD VENIPUNCTURE: CPT

## 2022-09-16 NOTE — PROGRESS NOTES
Subjective    Mr. Hendrix is 57 y.o. male    Chief Complaint: History of prostate cancer    History of Present Illness    57-year-old male established patient follow-up for history of prostate cancer status post RALP by Dr. Vargas on 11/14/2018 for [id number] [place] 8 prostate cancer with negative margins.  His PSA remains undetectable.  He denies JOSE no pads.  Sildenafil working well for his ED.  Tadalafil gave him a terrible headache.       Lab Results   Component Value Date    PSA <0.1 09/16/2022    PSA <0.1 03/14/2022    PSA <0.1 09/17/2021       The following portions of the patient's history were reviewed and updated as appropriate: allergies, current medications, past family history, past medical history, past social history, past surgical history and problem list.    Review of Systems      Current Outpatient Medications:   •  metoprolol tartrate (LOPRESSOR) 50 MG tablet, Take 1 tablet by mouth 2 (Two) Times a Day., Disp: 120 tablet, Rfl: 1  •  Omega-3 Fatty Acids (fish oil) 1000 MG capsule capsule, Take 3 capsules by mouth Daily With Breakfast., Disp: 90 capsule, Rfl: 1  •  sildenafil (VIAGRA) 100 MG tablet, Take 1 tablet by mouth Daily As Needed for Erectile Dysfunction., Disp: 10 tablet, Rfl: 11  •  simvastatin (Zocor) 10 MG tablet, Take 1 tablet by mouth Every Night., Disp: 90 tablet, Rfl: 1  •  vitamin D (ERGOCALCIFEROL) 1.25 MG (68712 UT) capsule capsule, Take 1 capsule by mouth 1 (One) Time Per Week., Disp: 5 capsule, Rfl: 1    Past Medical History:   Diagnosis Date   • Hypertension    • Prostate CA (HCC)        Past Surgical History:   Procedure Laterality Date   • HAND SURGERY     • KNEE SURGERY     • PROSTATECTOMY N/A 11/14/2018    Procedure: PROSTATECTOMY LAPAROSCOPIC WITH DAVINCI SI ROBOT, BILATERAL PELVIC LYMPH NODE DISSECTION;  Surgeon: Manfred Vargas MD;  Location: [facility] OR;  Service: DaVinci   • SHOULDER SURGERY         Social History     Socioeconomic History   • Marital status:   "  Tobacco Use   • Smoking status: Never Smoker   • Smokeless tobacco: Current User     Types: Snuff   • Tobacco comment: 1 CAN EVERY 2 DAYS   Vaping Use   • Vaping Use: Never used   Substance and Sexual Activity   • Alcohol use: No   • Drug use: No   • Sexual activity: Defer       Family History   Problem Relation Age of Onset   • No Known Problems Mother    • Heart disease Father        Objective    Ht 182.9 cm (72\")   Wt 112 kg (246 lb)   BMI 33.36 kg/m²     Physical Exam        Results for orders placed or performed in visit on 09/22/22   POC Urinalysis Dipstick, Multipro    Specimen: Urine   Result Value Ref Range    Color Yellow Yellow, Straw, Dark Yellow, Nikki    Clarity, UA Clear Clear    Glucose, UA Negative Negative mg/dL    Bilirubin Negative Negative    Ketones, UA Trace (A) Negative    Specific Gravity  1.025 1.005 - 1.030    Blood, UA Trace (A) Negative    pH, Urine 5.5 5.0 - 8.0    Protein, POC Negative Negative mg/dL    Urobilinogen, UA 0.2 E.U./dL Normal, 0.2 E.U./dL    Nitrite, UA Negative Negative    Leukocytes Negative Negative     Assessment and Plan    Diagnoses and all orders for this visit:    1. History of prostate cancer (Primary)  -     POC Urinalysis Dipstick, Multipro  -     PSA DIAGNOSTIC; Future    2. Erectile dysfunction after radical prostatectomy  -     sildenafil (VIAGRA) 100 MG tablet; Take 1 tablet by mouth Daily As Needed for Erectile Dysfunction.  Dispense: 10 tablet; Refill: 11      Doing well MILLER with undetectable PSA.    Continue sildenafil for his ED.  He will see me in  [facility] with preclinic PSA or sooner as needed           This document has been signed by DEEJAY Guerra MD on September 22, 2022 17:20 CDT        "

## 2022-09-18 LAB — PSA SERPL-MCNC: <0.1 NG/ML (ref 0–4)

## 2022-09-20 NOTE — PROGRESS NOTES
Venipuncture Blood Specimen Collection  Venipuncture performed in left ac by Gail Chandler MA with good hemostasis. Patient tolerated the procedure well without complications.   09/16/2022   Gail Chandler MA

## 2022-09-22 ENCOUNTER — OFFICE VISIT (OUTPATIENT)
Dept: UROLOGY | Facility: CLINIC | Age: 57
End: 2022-09-22

## 2022-09-22 VITALS — HEIGHT: 72 IN | WEIGHT: 246 LBS | BODY MASS INDEX: 33.32 KG/M2

## 2022-09-22 DIAGNOSIS — N52.31 ERECTILE DYSFUNCTION AFTER RADICAL PROSTATECTOMY: ICD-10-CM

## 2022-09-22 DIAGNOSIS — Z85.46 HISTORY OF PROSTATE CANCER: Primary | ICD-10-CM

## 2022-09-22 LAB
BILIRUB BLD-MCNC: NEGATIVE MG/DL
CLARITY, POC: CLEAR
COLOR UR: YELLOW
GLUCOSE UR STRIP-MCNC: NEGATIVE MG/DL
KETONES UR QL: ABNORMAL
LEUKOCYTE EST, POC: NEGATIVE
NITRITE UR-MCNC: NEGATIVE MG/ML
PH UR: 5.5 [PH] (ref 5–8)
PROT UR STRIP-MCNC: NEGATIVE MG/DL
RBC # UR STRIP: ABNORMAL /UL
SP GR UR: 1.02 (ref 1–1.03)
UROBILINOGEN UR QL: ABNORMAL

## 2022-09-22 PROCEDURE — 81001 URINALYSIS AUTO W/SCOPE: CPT | Performed by: UROLOGY

## 2022-09-22 PROCEDURE — 99214 OFFICE O/P EST MOD 30 MIN: CPT | Performed by: UROLOGY

## 2022-09-22 RX ORDER — SILDENAFIL 100 MG/1
100 TABLET, FILM COATED ORAL DAILY PRN
Qty: 10 TABLET | Refills: 11 | Status: SHIPPED | OUTPATIENT
Start: 2022-09-22

## 2022-10-12 DIAGNOSIS — E55.9 HYPOVITAMINOSIS D: ICD-10-CM

## 2022-10-12 RX ORDER — ERGOCALCIFEROL 1.25 MG/1
50000 CAPSULE ORAL WEEKLY
Qty: 5 CAPSULE | Refills: 1 | Status: SHIPPED | OUTPATIENT
Start: 2022-10-12 | End: 2023-01-03 | Stop reason: SDUPTHER

## 2022-10-12 NOTE — TELEPHONE ENCOUNTER
Rx Refill Note  Requested Prescriptions     Pending Prescriptions Disp Refills   • vitamin D (ERGOCALCIFEROL) 1.25 MG (93679 UT) capsule capsule 5 capsule 1     Sig: Take 1 capsule by mouth 1 (One) Time Per Week.      Last office visit with prescribing clinician: 8/3/2022      Next office visit with prescribing clinician: 10/26/2022     Vitamin D 25 hydroxy (08/04/2022 07:39)         Gail Chandler MA  10/12/22, 13:03 CDT

## 2022-11-08 ENCOUNTER — OFFICE VISIT (OUTPATIENT)
Dept: INTERNAL MEDICINE | Facility: CLINIC | Age: 57
End: 2022-11-08

## 2022-11-08 VITALS
BODY MASS INDEX: 33.05 KG/M2 | HEIGHT: 72 IN | HEART RATE: 68 BPM | DIASTOLIC BLOOD PRESSURE: 80 MMHG | RESPIRATION RATE: 18 BRPM | TEMPERATURE: 98 F | OXYGEN SATURATION: 97 % | SYSTOLIC BLOOD PRESSURE: 124 MMHG | WEIGHT: 244 LBS

## 2022-11-08 DIAGNOSIS — E78.2 MIXED HYPERLIPIDEMIA: Primary | ICD-10-CM

## 2022-11-08 DIAGNOSIS — I10 PRIMARY HYPERTENSION: ICD-10-CM

## 2022-11-08 PROCEDURE — 99213 OFFICE O/P EST LOW 20 MIN: CPT

## 2022-11-08 NOTE — PROGRESS NOTES
Subjective     Chief Complaint   Patient presents with   • Hypertension     Follow up.        History of Present Illness  Patient presents today for hypertension follow up. Currently on metoprolol 50 mg twice a day. Is on simvastatin 10 mg daily. Denies any side effects on current medications. States that since switching to Zocor he has not had any issues with tolerating statin therapy.  BP is controlled today 124/80.  Patient's PMR from outside medical facility reviewed and noted.    Review of Systems   Constitutional: Negative for activity change, fatigue and unexpected weight change.   HENT: Negative for mouth sores and trouble swallowing.    Eyes: Negative for discharge and visual disturbance.   Respiratory: Negative for cough and shortness of breath.    Cardiovascular: Negative for chest pain and leg swelling.   Gastrointestinal: Negative for abdominal pain, constipation, diarrhea and nausea.   Genitourinary: Negative for decreased urine volume, difficulty urinating and hematuria.   Musculoskeletal: Negative for back pain and gait problem.   Skin: Negative for color change and rash.   Allergic/Immunologic: Negative for environmental allergies and immunocompromised state.   Neurological: Negative for weakness and headaches.   Psychiatric/Behavioral: Negative for confusion and sleep disturbance.        Otherwise complete ROS reviewed and negative except as mentioned in the HPI.    Past Medical History:   Past Medical History:   Diagnosis Date   • Hypertension    • Prostate CA (HCC)      Past Surgical History:  Past Surgical History:   Procedure Laterality Date   • HAND SURGERY     • KNEE SURGERY     • PROSTATECTOMY N/A 11/14/2018    Procedure: PROSTATECTOMY LAPAROSCOPIC WITH DAVINCI SI ROBOT, BILATERAL PELVIC LYMPH NODE DISSECTION;  Surgeon: Manfred Vargas MD;  Location: [facility] OR;  Service: DaVinci   • SHOULDER SURGERY       Social History:  reports that he has never smoked. His smokeless tobacco  "use includes snuff. He reports that he does not drink alcohol and does not use drugs.    Family History: family history includes Heart disease in his father; No Known Problems in his mother.      Allergies:  No Known Allergies  Medications:  Prior to Admission medications    Medication Sig Start Date End Date Taking? Authorizing Provider   metoprolol tartrate (LOPRESSOR) 50 MG tablet Take 1 tablet by mouth 2 (Two) Times a Day. 8/3/22  Yes Margaret Gamboa APRN   Omega-3 Fatty Acids (fish oil) 1000 MG capsule capsule Take 3 capsules by mouth Daily With Breakfast. 8/5/22  Yes Margaret Gamboa APRN   sildenafil (VIAGRA) 100 MG tablet Take 1 tablet by mouth Daily As Needed for Erectile Dysfunction. 9/22/22  Yes Simon Guerra MD   simvastatin (Zocor) 10 MG tablet Take 1 tablet by mouth Every Night. 8/15/22  Yes Margaret Gamboa APRN   vitamin D (ERGOCALCIFEROL) 1.25 MG (94807 UT) capsule capsule Take 1 capsule by mouth 1 (One) Time Per Week. 10/12/22  Yes Margaret Gamboa APRN         Objective     Vital Signs: /80 (BP Location: Right arm, Patient Position: Sitting, Cuff Size: Adult)   Pulse 68   Temp 98 °F (36.7 °C) (Skin)   Resp 18   Ht 182.9 cm (72\")   Wt 111 kg (244 lb)   SpO2 97%   BMI 33.09 kg/m²   Physical Exam  Vitals and nursing note reviewed.   Constitutional:       General: He is not in acute distress.     Appearance: Normal appearance. He is not ill-appearing.   HENT:      Head: Normocephalic and atraumatic.      Right Ear: External ear normal.      Left Ear: External ear normal.      Nose: Nose normal.      Mouth/Throat:      Mouth: Mucous membranes are moist.      Pharynx: No posterior oropharyngeal erythema.   Eyes:      General: No scleral icterus.     Extraocular Movements: Extraocular movements intact.      Conjunctiva/sclera: Conjunctivae normal.      Pupils: Pupils are equal, round, and reactive to light.   Cardiovascular:      Rate and Rhythm: Normal rate and regular rhythm.     "  Pulses: Normal pulses.      Heart sounds: Normal heart sounds.   Pulmonary:      Effort: Pulmonary effort is normal. No respiratory distress.      Breath sounds: Normal breath sounds. No wheezing.   Abdominal:      General: Abdomen is flat. Bowel sounds are normal.      Palpations: Abdomen is soft.      Tenderness: There is no abdominal tenderness.   Musculoskeletal:         General: Normal range of motion.      Cervical back: Normal range of motion.      Right lower leg: No edema.      Left lower leg: No edema.   Lymphadenopathy:      Cervical: No cervical adenopathy.   Skin:     General: Skin is warm and dry.      Findings: No erythema or rash.   Neurological:      General: No focal deficit present.      Mental Status: He is alert and oriented to person, place, and time. Mental status is at baseline.      Motor: No weakness.   Psychiatric:         Mood and Affect: Mood normal.         Behavior: Behavior normal.         Thought Content: Thought content normal.         Judgment: Judgment normal.           Results Reviewed:  Glucose   Date Value Ref Range Status   08/04/2022 90 65 - 99 mg/dL Final   11/15/2018 111 (H) 70 - 100 mg/dL Final     BUN   Date Value Ref Range Status   08/04/2022 12 6 - 24 mg/dL Final   11/15/2018 14 5 - 21 mg/dL Final     Creatinine   Date Value Ref Range Status   08/04/2022 1.07 0.76 - 1.27 mg/dL Final   11/15/2018 1.13 0.50 - 1.40 mg/dL Final   10/09/2018 1.20 0.60 - 1.30 mg/dL Final     Comment:     Serial Number: [id number]:  [id number]     Sodium   Date Value Ref Range Status   08/04/2022 138 134 - 144 mmol/L Final   11/15/2018 138 135 - 145 mmol/L Final     Potassium   Date Value Ref Range Status   08/04/2022 4.5 3.5 - 5.2 mmol/L Final   11/15/2018 4.4 3.5 - 5.3 mmol/L Final     Chloride   Date Value Ref Range Status   08/04/2022 100 96 - 106 mmol/L Final   11/15/2018 101 98 - 110 mmol/L Final     CO2   Date Value Ref Range Status   11/15/2018 29.0 24.0 - 31.0 mmol/L Final     Total  CO2   Date Value Ref Range Status   08/04/2022 21 20 - 29 mmol/L Final     Calcium   Date Value Ref Range Status   08/04/2022 9.3 8.7 - 10.2 mg/dL Final   11/15/2018 8.0 (L) 8.4 - 10.4 mg/dL Final     ALT (SGPT)   Date Value Ref Range Status   08/04/2022 68 (H) 0 - 44 IU/L Final     AST (SGOT)   Date Value Ref Range Status   08/04/2022 46 (H) 0 - 40 IU/L Final     WBC   Date Value Ref Range Status   08/04/2022 10.7 3.4 - 10.8 x10E3/uL Final     Hematocrit   Date Value Ref Range Status   08/04/2022 50.2 37.5 - 51.0 % Final   11/15/2018 38.3 (L) 40.0 - 52.0 % Final     Platelets   Date Value Ref Range Status   08/04/2022 243 150 - 450 x10E3/uL Final   11/15/2018 183 130 - 400 10*3/mm3 Final     Triglycerides   Date Value Ref Range Status   08/04/2022 349 (H) 0 - 149 mg/dL Final     HDL Cholesterol   Date Value Ref Range Status   08/04/2022 30 (L) >39 mg/dL Final     LDL Chol Calc (NIH)   Date Value Ref Range Status   08/04/2022 120 (H) 0 - 99 mg/dL Final         Assessment / Plan     Assessment/Plan:  1. Mixed hyperlipidemia  - Lipid panel    2. Primary hypertension  - CBC w AUTO Differential  - Comprehensive metabolic panel  -BP controlled on current medication regimen.      Return in about 6 months (around 5/8/2023) for Recheck. unless patient needs to be seen sooner or acute issues arise.      I have discussed the patient results/orders and and plan/recommendation with them at today's visit.      Margaret Gamboa, APRN   11/08/2022

## 2022-11-09 LAB
ALBUMIN SERPL-MCNC: 4.3 G/DL (ref 3.8–4.9)
ALBUMIN/GLOB SERPL: 1.4 {RATIO} (ref 1.2–2.2)
ALP SERPL-CCNC: 76 IU/L (ref 44–121)
ALT SERPL-CCNC: 48 IU/L (ref 0–44)
AST SERPL-CCNC: 38 IU/L (ref 0–40)
BASOPHILS # BLD AUTO: 0.1 X10E3/UL (ref 0–0.2)
BASOPHILS NFR BLD AUTO: 1 %
BILIRUB SERPL-MCNC: 0.9 MG/DL (ref 0–1.2)
BUN SERPL-MCNC: 17 MG/DL (ref 6–24)
BUN/CREAT SERPL: 15 (ref 9–20)
CALCIUM SERPL-MCNC: 9.6 MG/DL (ref 8.7–10.2)
CHLORIDE SERPL-SCNC: 103 MMOL/L (ref 96–106)
CHOLEST SERPL-MCNC: 183 MG/DL (ref 100–199)
CO2 SERPL-SCNC: 21 MMOL/L (ref 20–29)
CREAT SERPL-MCNC: 1.16 MG/DL (ref 0.76–1.27)
EGFRCR SERPLBLD CKD-EPI 2021: 73 ML/MIN/1.73
EOSINOPHIL # BLD AUTO: 0.5 X10E3/UL (ref 0–0.4)
EOSINOPHIL NFR BLD AUTO: 6 %
ERYTHROCYTE [DISTWIDTH] IN BLOOD BY AUTOMATED COUNT: 12.9 % (ref 11.6–15.4)
GLOBULIN SER CALC-MCNC: 3.1 G/DL (ref 1.5–4.5)
GLUCOSE SERPL-MCNC: 97 MG/DL (ref 70–99)
HCT VFR BLD AUTO: 50.9 % (ref 37.5–51)
HDLC SERPL-MCNC: 35 MG/DL
HGB BLD-MCNC: 17.4 G/DL (ref 13–17.7)
IMM GRANULOCYTES # BLD AUTO: 0 X10E3/UL (ref 0–0.1)
IMM GRANULOCYTES NFR BLD AUTO: 0 %
LDLC SERPL CALC-MCNC: 111 MG/DL (ref 0–99)
LYMPHOCYTES # BLD AUTO: 2 X10E3/UL (ref 0.7–3.1)
LYMPHOCYTES NFR BLD AUTO: 24 %
MCH RBC QN AUTO: 32.8 PG (ref 26.6–33)
MCHC RBC AUTO-ENTMCNC: 34.2 G/DL (ref 31.5–35.7)
MCV RBC AUTO: 96 FL (ref 79–97)
MONOCYTES # BLD AUTO: 0.7 X10E3/UL (ref 0.1–0.9)
MONOCYTES NFR BLD AUTO: 8 %
NEUTROPHILS # BLD AUTO: 5.1 X10E3/UL (ref 1.4–7)
NEUTROPHILS NFR BLD AUTO: 61 %
PLATELET # BLD AUTO: 263 X10E3/UL (ref 150–450)
POTASSIUM SERPL-SCNC: 4.8 MMOL/L (ref 3.5–5.2)
PROT SERPL-MCNC: 7.4 G/DL (ref 6–8.5)
RBC # BLD AUTO: 5.3 X10E6/UL (ref 4.14–5.8)
SODIUM SERPL-SCNC: 138 MMOL/L (ref 134–144)
TRIGL SERPL-MCNC: 210 MG/DL (ref 0–149)
VLDLC SERPL CALC-MCNC: 37 MG/DL (ref 5–40)
WBC # BLD AUTO: 8.4 X10E3/UL (ref 3.4–10.8)

## 2022-11-09 NOTE — PROGRESS NOTES
Called patient with results. Patient voiced understanding. Do you want to see him in 6 months or a year?

## 2022-11-09 NOTE — PROGRESS NOTES
Lipid panel does look some better triglycerides have decreased, LDL is still slightly higher than I would like in, however has decreased since last checked.  All other labs are good.

## 2022-12-02 DIAGNOSIS — I10 PRIMARY HYPERTENSION: ICD-10-CM

## 2022-12-02 RX ORDER — METOPROLOL TARTRATE 50 MG/1
50 TABLET, FILM COATED ORAL 2 TIMES DAILY
Qty: 120 TABLET | Refills: 1 | Status: SHIPPED | OUTPATIENT
Start: 2022-12-02 | End: 2023-03-28 | Stop reason: SDUPTHER

## 2022-12-02 NOTE — TELEPHONE ENCOUNTER
Rx Refill Note  Requested Prescriptions     Pending Prescriptions Disp Refills   • metoprolol tartrate (LOPRESSOR) 50 MG tablet 120 tablet 1     Sig: Take 1 tablet by mouth 2 (Two) Times a Day.      Last office visit with prescribing clinician: 11/8/2022   Last telemedicine visit with prescribing clinician: Visit date not found   Next office visit with prescribing clinician: 9/21/2023                         Would you like a call back once the refill request has been completed: [] Yes [] No    If the office needs to give you a call back, can they leave a voicemail: [] Yes [] No    Gail Chandler MA  12/02/22, 10:41 CST

## 2023-01-03 DIAGNOSIS — E55.9 HYPOVITAMINOSIS D: ICD-10-CM

## 2023-01-03 NOTE — TELEPHONE ENCOUNTER
Rx Refill Note  Requested Prescriptions     Pending Prescriptions Disp Refills   • vitamin D (ERGOCALCIFEROL) 1.25 MG (58289 UT) capsule capsule 5 capsule 1     Sig: Take 1 capsule by mouth 1 (One) Time Per Week.      Last office visit with prescribing clinician: 11/8/2022   Last telemedicine visit with prescribing clinician: Visit date not found   Next office visit with prescribing clinician: 9/21/2023                         Would you like a call back once the refill request has been completed: [] Yes [] No    If the office needs to give you a call back, can they leave a voicemail: [] Yes [] No    Gail Chandler MA  01/03/23, 17:42 CST

## 2023-01-04 RX ORDER — ERGOCALCIFEROL 1.25 MG/1
50000 CAPSULE ORAL WEEKLY
Qty: 5 CAPSULE | Refills: 1 | Status: SHIPPED | OUTPATIENT
Start: 2023-01-04

## 2023-02-14 RX ORDER — SIMVASTATIN 10 MG
10 TABLET ORAL NIGHTLY
Qty: 90 TABLET | Refills: 1 | Status: SHIPPED | OUTPATIENT
Start: 2023-02-14

## 2023-02-14 NOTE — TELEPHONE ENCOUNTER
Rx Refill Note  Requested Prescriptions     Pending Prescriptions Disp Refills   • simvastatin (Zocor) 10 MG tablet 90 tablet 1     Sig: Take 1 tablet by mouth Every Night.      Last office visit with prescribing clinician: 11/8/2022   Last telemedicine visit with prescribing clinician: Visit date not found   Next office visit with prescribing clinician: 9/21/2023                         Would you like a call back once the refill request has been completed: [] Yes [] No    If the office needs to give you a call back, can they leave a voicemail: [] Yes [] No    Gail Chandler MA  02/14/23, 11:40 CST

## 2023-03-28 DIAGNOSIS — I10 PRIMARY HYPERTENSION: ICD-10-CM

## 2023-03-28 RX ORDER — METOPROLOL TARTRATE 50 MG/1
50 TABLET, FILM COATED ORAL 2 TIMES DAILY
Qty: 120 TABLET | Refills: 1 | Status: SHIPPED | OUTPATIENT
Start: 2023-03-28

## 2023-03-28 NOTE — TELEPHONE ENCOUNTER
Rx Refill Note  Requested Prescriptions     Pending Prescriptions Disp Refills   • metoprolol tartrate (LOPRESSOR) 50 MG tablet 120 tablet 1     Sig: Take 1 tablet by mouth 2 (Two) Times a Day.      Last office visit with prescribing clinician: 11/8/2022   Last telemedicine visit with prescribing clinician: 9/21/2023   Next office visit with prescribing clinician: 9/21/2023                         Would you like a call back once the refill request has been completed: [] Yes [] No    If the office needs to give you a call back, can they leave a voicemail: [] Yes [] No    Gail Chandler MA  03/28/23, 13:16 CDT

## 2023-04-18 DIAGNOSIS — E55.9 HYPOVITAMINOSIS D: ICD-10-CM

## 2023-04-18 RX ORDER — ERGOCALCIFEROL 1.25 MG/1
50000 CAPSULE ORAL WEEKLY
Qty: 5 CAPSULE | Refills: 1 | Status: SHIPPED | OUTPATIENT
Start: 2023-04-18

## 2023-04-18 NOTE — TELEPHONE ENCOUNTER
Rx Refill Note  Requested Prescriptions     Pending Prescriptions Disp Refills   • vitamin D (ERGOCALCIFEROL) 1.25 MG (46201 UT) capsule capsule 5 capsule 1     Sig: Take 1 capsule by mouth 1 (One) Time Per Week.      Last office visit with prescribing clinician: 11/8/2022   Last telemedicine visit with prescribing clinician: 9/21/2023   Next office visit with prescribing clinician: 9/21/2023                         Would you like a call back once the refill request has been completed: [] Yes [] No    If the office needs to give you a call back, can they leave a voicemail: [] Yes [] No    Gail Chandler MA  04/18/23, 09:13 CDT

## 2023-07-28 DIAGNOSIS — I10 PRIMARY HYPERTENSION: ICD-10-CM

## 2023-07-28 RX ORDER — METOPROLOL TARTRATE 50 MG/1
50 TABLET, FILM COATED ORAL 2 TIMES DAILY
Qty: 120 TABLET | Refills: 1 | Status: SHIPPED | OUTPATIENT
Start: 2023-07-28

## 2023-07-28 NOTE — TELEPHONE ENCOUNTER
Rx Refill Note  Requested Prescriptions     Pending Prescriptions Disp Refills    metoprolol tartrate (LOPRESSOR) 50 MG tablet 120 tablet 1     Sig: Take 1 tablet by mouth 2 (Two) Times a Day.      Last office visit with prescribing clinician: 11/08/2022   Last telemedicine visit with prescribing clinician: Visit date not found   Next office visit with prescribing clinician: 09/21/2023                         Would you like a call back once the refill request has been completed: [] Yes [] No    If the office needs to give you a call back, can they leave a voicemail: [] Yes [] No    Gail Chandler MA  07/28/23, 11:06 CDT

## 2023-09-15 RX ORDER — SIMVASTATIN 10 MG
10 TABLET ORAL NIGHTLY
Qty: 90 TABLET | Refills: 1 | Status: SHIPPED | OUTPATIENT
Start: 2023-09-15

## 2023-09-15 NOTE — TELEPHONE ENCOUNTER
Rx Refill Note  Requested Prescriptions     Pending Prescriptions Disp Refills    simvastatin (Zocor) 10 MG tablet 90 tablet 1     Sig: Take 1 tablet by mouth Every Night.      Last office visit with prescribing clinician: 11/8/2022   Last telemedicine visit with prescribing clinician: Visit date not found   Next office visit with prescribing clinician: 9/21/2023                         Would you like a call back once the refill request has been completed: [] Yes [] No    If the office needs to give you a call back, can they leave a voicemail: [] Yes [] No    Gail Chandler MA  09/15/23, 10:15 CDT

## 2023-09-20 NOTE — PROGRESS NOTES
Subjective    Mr. Hendrix is 58 y.o. male    Chief Complaint: History of prostate cancer    History of Present Illness    58-year-old male established patient follow-up for history of prostate cancer and post prostatectomy erectile dysfunction status post RALP by Dr. Vargas on 11/14/2018 for [id number] [place] 8 prostate cancer with negative margins.  His PSA remains undetectable.  He denies JOSE no pads.  Sildenafil working satisfactorily for his ED.  Tadalafil gave him a terrible headache.      Lab Results   Component Value Date    PSA <0.1 09/21/2023    PSA <0.1 09/16/2022    PSA <0.1 03/14/2022       The following portions of the patient's history were reviewed and updated as appropriate: allergies, current medications, past family history, past medical history, past social history, past surgical history and problem list.    Review of Systems      Current Outpatient Medications:     metoprolol tartrate (LOPRESSOR) 50 MG tablet, Take 1 tablet by mouth 2 (Two) Times a Day., Disp: 240 tablet, Rfl: 4    Omega-3 Fatty Acids (fish oil) 1000 MG capsule capsule, Take 3 capsules by mouth Daily With Breakfast., Disp: 90 capsule, Rfl: 1    sildenafil (VIAGRA) 100 MG tablet, Take 1 tablet by mouth Daily As Needed for Erectile Dysfunction., Disp: 10 tablet, Rfl: 11    simvastatin (Zocor) 10 MG tablet, Take 1 tablet by mouth Every Night., Disp: 90 tablet, Rfl: 3    vitamin D (ERGOCALCIFEROL) 1.25 MG (47558 UT) capsule capsule, Take 1 capsule by mouth 1 (One) Time Per Week., Disp: 10 capsule, Rfl: 3    Past Medical History:   Diagnosis Date    Hypertension     Prostate CA        Past Surgical History:   Procedure Laterality Date    HAND SURGERY      KNEE SURGERY      PROSTATECTOMY N/A 11/14/2018    Procedure: PROSTATECTOMY LAPAROSCOPIC WITH DAVINCI SI ROBOT, BILATERAL PELVIC LYMPH NODE DISSECTION;  Surgeon: Manfred Vargas MD;  Location: [facility] OR;  Service: DaVinci    SHOULDER SURGERY         Social History     Socioeconomic  "History    Marital status:    Tobacco Use    Smoking status: Never    Smokeless tobacco: Current     Types: Snuff    Tobacco comments:     1 CAN EVERY 2 DAYS   Vaping Use    Vaping Use: Never used   Substance and Sexual Activity    Alcohol use: No    Drug use: No    Sexual activity: Defer       Family History   Problem Relation Age of Onset    No Known Problems Mother     Heart disease Father        Objective    Temp 97.2 °F (36.2 °C)   Ht 182.9 cm (72\")   Wt 112 kg (246 lb)   BMI 33.36 kg/m²     Physical Exam        Results for orders placed or performed in visit on 09/28/23   POC Urinalysis Dipstick, Multipro    Specimen: Urine   Result Value Ref Range    Color Yellow Yellow, Straw, Dark Yellow, Nikki    Clarity, UA Clear Clear    Glucose, UA Negative Negative mg/dL    Bilirubin Negative Negative    Ketones, UA Negative Negative    Specific Gravity  1.001 (A) 1.005 - 1.030    Blood, UA Trace (A) Negative    pH, Urine 5.5 5.0 - 8.0    Protein, POC Negative Negative mg/dL    Urobilinogen, UA Normal Normal, 0.2 E.U./dL    Nitrite, UA Negative Negative    Leukocytes Negative Negative     Assessment and Plan    Diagnoses and all orders for this visit:    1. History of prostate cancer (Primary)  -     POC Urinalysis Dipstick, Multipro  -     PSA DIAGNOSTIC; Future    2. Erectile dysfunction after radical prostatectomy  -     Discontinue: sildenafil (VIAGRA) 100 MG tablet; Take 1 tablet by mouth Daily As Needed for Erectile Dysfunction.  Dispense: 10 tablet; Refill: 11  -     sildenafil (VIAGRA) 100 MG tablet; Take 1 tablet by mouth Daily As Needed for Erectile Dysfunction.  Dispense: 10 tablet; Refill: 11      Doing well MILLER with undetectable PSA.    Continue sildenafil for his ED.  He will see me in  1 year in my [facility] with preclinic PSA or sooner as needed       This document has been signed by DEEJAY Guerra MD on September 29, 2023 13:37 CDT              "

## 2023-09-21 ENCOUNTER — TELEPHONE (OUTPATIENT)
Dept: INTERNAL MEDICINE | Facility: CLINIC | Age: 58
End: 2023-09-21

## 2023-09-21 ENCOUNTER — OFFICE VISIT (OUTPATIENT)
Dept: INTERNAL MEDICINE | Facility: CLINIC | Age: 58
End: 2023-09-21
Payer: COMMERCIAL

## 2023-09-21 VITALS
DIASTOLIC BLOOD PRESSURE: 79 MMHG | OXYGEN SATURATION: 97 % | HEIGHT: 72 IN | RESPIRATION RATE: 17 BRPM | SYSTOLIC BLOOD PRESSURE: 133 MMHG | BODY MASS INDEX: 33.32 KG/M2 | TEMPERATURE: 97.1 F | HEART RATE: 60 BPM | WEIGHT: 246 LBS

## 2023-09-21 DIAGNOSIS — E55.9 HYPOVITAMINOSIS D: ICD-10-CM

## 2023-09-21 DIAGNOSIS — E78.2 MIXED HYPERLIPIDEMIA: ICD-10-CM

## 2023-09-21 DIAGNOSIS — E55.9 VITAMIN D DEFICIENCY: ICD-10-CM

## 2023-09-21 DIAGNOSIS — I10 PRIMARY HYPERTENSION: ICD-10-CM

## 2023-09-21 DIAGNOSIS — Z11.59 ENCOUNTER FOR HEPATITIS C SCREENING TEST FOR LOW RISK PATIENT: ICD-10-CM

## 2023-09-21 DIAGNOSIS — R79.89 LOW TESTOSTERONE IN MALE: ICD-10-CM

## 2023-09-21 DIAGNOSIS — Z12.11 SCREENING FOR COLON CANCER: ICD-10-CM

## 2023-09-21 DIAGNOSIS — Z12.5 SCREENING FOR MALIGNANT NEOPLASM OF PROSTATE: ICD-10-CM

## 2023-09-21 DIAGNOSIS — Z00.00 ROUTINE GENERAL MEDICAL EXAMINATION AT A HEALTH CARE FACILITY: Primary | ICD-10-CM

## 2023-09-21 DIAGNOSIS — Z23 NEED FOR VACCINATION: ICD-10-CM

## 2023-09-21 RX ORDER — SIMVASTATIN 10 MG
10 TABLET ORAL NIGHTLY
Qty: 90 TABLET | Refills: 3 | Status: SHIPPED | OUTPATIENT
Start: 2023-09-21

## 2023-09-21 RX ORDER — METOPROLOL TARTRATE 50 MG/1
50 TABLET, FILM COATED ORAL 2 TIMES DAILY
Qty: 240 TABLET | Refills: 4 | Status: SHIPPED | OUTPATIENT
Start: 2023-09-21

## 2023-09-21 RX ORDER — ERGOCALCIFEROL 1.25 MG/1
50000 CAPSULE ORAL WEEKLY
Qty: 10 CAPSULE | Refills: 3 | Status: SHIPPED | OUTPATIENT
Start: 2023-09-21

## 2023-09-21 NOTE — PROGRESS NOTES
Subjective     Chief Complaint   Patient presents with    Annual Exam       History of Present Illness  The patient,  1965 presents for follow-up.    The patient is doing fine. He is having sinus issues but that is not a new problem. States believes his BP is high because has been doing over the counter cough medications and syrups. Does check BP at home regularly admits that runs well at home, has wrist and arm BP cuffs. He did take his blood pressure medication but has been taking allergy medication too.     He denies abnormal Cologuard studies and denies a family history of colon cancer. He did have the hepatitis vaccines.     The patient has an appointment with Dr. Guerra, urologist next week. At about age 52, he had a radical prostatectomy. Has done well.     The patient denies chest pain, dyspnea, or dizziness. He denies blood in his stool or hematuria.     Patient's PMR from outside medical facility reviewed and noted.    Review of Systems   Constitutional:  Negative for activity change, fatigue and unexpected weight change.   HENT:  Positive for congestion and rhinorrhea. Negative for mouth sores and trouble swallowing.    Eyes:  Negative for discharge and visual disturbance.   Respiratory:  Negative for cough and shortness of breath.    Cardiovascular:  Negative for chest pain and leg swelling.   Gastrointestinal:  Negative for abdominal pain, constipation, diarrhea and nausea.   Genitourinary:  Negative for decreased urine volume, difficulty urinating and hematuria.   Musculoskeletal:  Negative for back pain and gait problem.   Skin:  Negative for color change and rash.   Allergic/Immunologic: Negative for environmental allergies and immunocompromised state.   Neurological:  Negative for weakness and headaches.   Psychiatric/Behavioral:  Negative for confusion and sleep disturbance. The patient is not nervous/anxious.       Otherwise complete ROS reviewed and negative except as mentioned in  the [facility].    Past Medical History:   Past Medical History:   Diagnosis Date    Hypertension     Prostate CA      Past Surgical History:  Past Surgical History:   Procedure Laterality Date    HAND SURGERY      KNEE SURGERY      PROSTATECTOMY N/A 11/14/2018    Procedure: PROSTATECTOMY LAPAROSCOPIC WITH DAVINCI SI ROBOT, BILATERAL PELVIC LYMPH NODE DISSECTION;  Surgeon: Manfred Vargas MD;  Location: [facility];  Service: DaVinci    SHOULDER SURGERY       Social History:  reports that he has never smoked. His smokeless tobacco use includes snuff. He reports that he does not drink alcohol and does not use drugs.    Family History: family history includes Heart disease in his father; No Known Problems in his mother.      Allergies:  No Known Allergies  Medications:  Prior to Admission medications    Medication Sig Start Date End Date Taking? Authorizing Provider   metoprolol tartrate (LOPRESSOR) 50 MG tablet Take 1 tablet by mouth 2 (Two) Times a Day. 7/28/23  Yes Brendan Husain APRN   Omega-3 Fatty Acids (fish oil) 1000 MG capsule capsule Take 3 capsules by mouth Daily With Breakfast. 8/5/22  Yes Margaret Gamboa APRN   sildenafil (VIAGRA) 100 MG tablet Take 1 tablet by mouth Daily As Needed for Erectile Dysfunction. 9/22/22  Yes Simon Guerra MD   simvastatin (Zocor) 10 MG tablet Take 1 tablet by mouth Every Night. 9/15/23  Yes Margaret Gamboa APRN   vitamin D (ERGOCALCIFEROL) 1.25 MG (92967 UT) capsule capsule Take 1 capsule by mouth 1 (One) Time Per Week. 6/27/23  Yes Margaret Gamboa APRN       NERI:        PHQ-9 Depression Screening  Little interest or pleasure in doing things? 0-->not at all   Feeling down, depressed, or hopeless? 0-->not at all   Trouble falling or staying asleep, or sleeping too much?     Feeling tired or having little energy?     Poor appetite or overeating?     Feeling bad about yourself - or that you are a failure or have let yourself or your family down?     Trouble  "concentrating on things, such as reading the newspaper or watching television?     Moving or speaking so slowly that other people could have noticed? Or the opposite - being so fidgety or restless that you have been moving around a lot more than usual?     Thoughts that you would be better off dead, or of hurting yourself in some way?     PHQ-9 Total Score 0   If you checked off any problems, how difficult have these problems made it for you to do your work, take care of things at home, or get along with other people?         PHQ-9 Total Score: 0   0 (Negative screening for depression)  Support given, observe for worsening symptoms    Objective     Vital Signs: /79 (BP Location: Left arm, Patient Position: Sitting, Cuff Size: Adult)   Pulse 60   Temp 97.1 °F (36.2 °C) (Skin)   Resp 17   Ht 182.9 cm (72\")   Wt 112 kg (246 lb)   SpO2 97%   BMI 33.36 kg/m²   Physical Exam  Vitals and nursing note reviewed.   Constitutional:       General: He is not in acute distress.     Appearance: Normal appearance. He is not ill-appearing.   HENT:      Head: Normocephalic and atraumatic.      Right Ear: External ear normal.      Left Ear: External ear normal.      Nose: Nose normal.      Mouth/Throat:      Mouth: Mucous membranes are moist.      Pharynx: No posterior oropharyngeal erythema.   Eyes:      General: No scleral icterus.     Extraocular Movements: Extraocular movements intact.      Conjunctiva/sclera: Conjunctivae normal.      Pupils: Pupils are equal, round, and reactive to light.   Cardiovascular:      Rate and Rhythm: Normal rate and regular rhythm.      Pulses: Normal pulses.      Heart sounds: Normal heart sounds.   Pulmonary:      Effort: Pulmonary effort is normal. No respiratory distress.      Breath sounds: Normal breath sounds. No wheezing.   Abdominal:      General: Abdomen is flat. Bowel sounds are normal.      Palpations: Abdomen is soft.      Tenderness: There is no abdominal tenderness. "   Musculoskeletal:         General: Normal range of motion.      Cervical back: Normal range of motion.      Right lower leg: No edema.      Left lower leg: No edema.   Skin:     General: Skin is warm and dry.      Findings: No erythema or rash.   Neurological:      General: No focal deficit present.      Mental Status: He is alert and oriented to person, place, and time. Mental status is at baseline.      Motor: No weakness.   Psychiatric:         Mood and Affect: Mood normal.         Behavior: Behavior normal.         Thought Content: Thought content normal.         Judgment: Judgment normal.       BMI is >= 30 and <35. (Class 1 Obesity). The following options were offered after discussion;: exercise counseling/recommendations and nutrition counseling/recommendations    Results Reviewed:  Glucose   Date Value Ref Range Status   11/08/2022 97 70 - 99 mg/dL Final   11/15/2018 111 (H) 70 - 100 mg/dL Final     BUN   Date Value Ref Range Status   11/08/2022 17 6 - 24 mg/dL Final   11/15/2018 14 5 - 21 mg/dL Final     Creatinine   Date Value Ref Range Status   11/08/2022 1.16 0.76 - 1.27 mg/dL Final   11/15/2018 1.13 0.50 - 1.40 mg/dL Final   10/09/2018 1.20 0.60 - 1.30 mg/dL Final     Comment:     Serial Number: [id number]:  [id number]     Sodium   Date Value Ref Range Status   11/08/2022 138 134 - 144 mmol/L Final   11/15/2018 138 135 - 145 mmol/L Final     Potassium   Date Value Ref Range Status   11/08/2022 4.8 3.5 - 5.2 mmol/L Final   11/15/2018 4.4 3.5 - 5.3 mmol/L Final     Chloride   Date Value Ref Range Status   11/08/2022 103 96 - 106 mmol/L Final   11/15/2018 101 98 - 110 mmol/L Final     CO2   Date Value Ref Range Status   11/15/2018 29.0 24.0 - 31.0 mmol/L Final     Total CO2   Date Value Ref Range Status   11/08/2022 21 20 - 29 mmol/L Final     Calcium   Date Value Ref Range Status   11/08/2022 9.6 8.7 - 10.2 mg/dL Final   11/15/2018 8.0 (L) 8.4 - 10.4 mg/dL Final     ALT (SGPT)   Date Value Ref Range  Status   11/08/2022 48 (H) 0 - 44 IU/L Final     AST (SGOT)   Date Value Ref Range Status   11/08/2022 38 0 - 40 IU/L Final     WBC   Date Value Ref Range Status   11/08/2022 8.4 3.4 - 10.8 x10E3/uL Final     Hematocrit   Date Value Ref Range Status   11/08/2022 50.9 37.5 - 51.0 % Final   11/15/2018 38.3 (L) 40.0 - 52.0 % Final     Platelets   Date Value Ref Range Status   11/08/2022 263 150 - 450 x10E3/uL Final   11/15/2018 183 130 - 400 10*3/mm3 Final     Triglycerides   Date Value Ref Range Status   11/08/2022 210 (H) 0 - 149 mg/dL Final     HDL Cholesterol   Date Value Ref Range Status   11/08/2022 35 (L) >39 mg/dL Final     LDL Chol Calc (NIH)   Date Value Ref Range Status   11/08/2022 111 (H) 0 - 99 mg/dL Final         Assessment / Plan     Assessment/Plan:    Health maintenance  Will order laboratory studies.   Will order Cologuard screening.   Will administer Tdap vaccine.     1. Routine general medical examination at a health care facility  - CBC & Differential  - Comprehensive metabolic panel    2. Mixed hyperlipidemia  - Lipid Panel    3. Vitamin D deficiency  - Vitamin D,25-Hydroxy    4. Low testosterone in male  - Testosterone    5. Screening for malignant neoplasm of prostate  - PSA SCREENING    6. Encounter for hepatitis C screening test for low risk patient  - Hepatitis C antibody    7. Screening for colon cancer  - Cologuard - Stool, Per Rectum; Future    8. Need for vaccination  - Tdap Vaccine => 8yo IM (BOOSTRIX)    .Will have lab work here today. Discussed testicular self exams, patient is aware how to do testicular exams and the importance of same. Discussed weight management and importance of maintaining a healthy weight. Discussed Vitamin D intake and the importance of adequate vitamin D for both Bone Health and a healthy immune system.  Discussed daily exercise and the importance of same, in regards to a healthy heart as well as helping to maintain his weight and improving his mental health.   BMI  colo guard is ordered. PSA will be drawn today.       Return in about 6 months (around 3/21/2024) for Recheck. unless patient needs to be seen sooner or acute issues arise.      I have discussed the patient results/orders and and plan/recommendation with them at today's visit.      ARUNA Keene   09/21/2023    Transcribed from ambient dictation for ARUNA Keene by Michelle Herbert.  09/21/23   09:23 CDT    Patient or patient representative verbalized consent to the visit recording.  I have personally performed the services described in this document as transcribed by the above individual, and it is both accurate and complete.

## 2023-09-21 NOTE — TELEPHONE ENCOUNTER
Pt was in clinic today seeing Margaret and in wrap up notes, she put return in 6mo.  She will still be on  Leave therefore, we moved his appt to April.  Pt stated Margaret had him on 1yr refills, is this still correct and can she do this again, so pt can see her in April when she returns?

## 2023-09-21 NOTE — LETTER
[facility]  Vaccine Consent Form    Patient Name:  Caden Hendrix  Patient :  1965     Vaccine(s) Ordered    Tdap Vaccine => 6yo IM (BOOSTRIX)        Screening Checklist  The following questions should be completed prior to vaccination. If you answer “yes” to any question, it does not necessarily mean you should not be vaccinated. It just means we may need to clarify or ask more questions. If a question is unclear, please ask your healthcare provider to explain it.    Yes No   Any fever or moderate to severe illness today (mild illness and/or antibiotic treatment are not contraindications)?     Do you have a history of a serious reaction to any previous vaccinations, such as anaphylaxis, encephalopathy within 7 days, Guillain-[place] syndrome within 6 weeks, seizure?     Have you received any live vaccine(s) in the past month (MMR, SANDRA)?     Do you have an anaphylactic allergy to latex (DTaP, DTaP-IPV, Hep A, Hep B, MenB, RV, Td, Tdap), baker’s yeast (Hep B, HPV), or gelatin (SANDRA, MMR)?     Do you have an anaphylactic allergy to neomycin (Rabies, SANDRA, MMR, IPV, Hep A), polymyxin B (IPV), or streptomycin (IPV)?      Any cancer, leukemia, AIDS, or other immune system disorder? (SANDRA, MMR, RV)     Do you have a parent, brother, or sister with an immune system problem (if immune competence of vaccine recipient clinically verified, can proceed)? (MMR, SANDRA)     Any recent steroid treatments for >2 weeks, chemotherapy, or radiation treatment? (SANDRA, MMR)     Have you received antibody-containing blood transfusions or IVIG in the past 11 months (recommended interval is dependent on product)? (MMR, SANDRA)     Have you taken antiviral drugs (acyclovir, famciclovir, valacyclovir) in the last 24 or 48 hours, respectively (SANDRA)?      Are you pregnant or planning to become pregnant within 1 month? (SANDRA, MMR, HPV, IPV, MenB; For hep B- refer to Engerix-B)     For infants, have you ever been told your child has had  intussusception or a medical emergency involving obstruction of the intestine (RV)? If not for an infant, can skip this question.         *Ordering Physician/APC should be consulted if “yes” is checked by the patient or guardian above.      I have received, read, and understand the Vaccine Information Statement (VIS) for each vaccine ordered above.  I have considered my health status as well as the health status of my close contacts.  I have taken the opportunity to discuss my vaccine questions with my health care provider.   I have requested that the ordered vaccine(s) be given to me.  I understand the benefits and risks of the vaccines.  I understand that I should remain in the clinic for 15 minutes after receiving the vaccine(s).  _________________________________________________________  Signature of Patient or Parent/Legal Guardian ____________________  Date

## 2023-09-22 LAB
25(OH)D3+25(OH)D2 SERPL-MCNC: 57.9 NG/ML (ref 30–100)
ALBUMIN SERPL-MCNC: 3.9 G/DL (ref 3.8–4.9)
ALBUMIN/GLOB SERPL: 1.3 {RATIO} (ref 1.2–2.2)
ALP SERPL-CCNC: 79 IU/L (ref 44–121)
ALT SERPL-CCNC: 42 IU/L (ref 0–44)
AST SERPL-CCNC: 31 IU/L (ref 0–40)
BASOPHILS # BLD AUTO: 0.1 X10E3/UL (ref 0–0.2)
BASOPHILS NFR BLD AUTO: 1 %
BILIRUB SERPL-MCNC: 0.5 MG/DL (ref 0–1.2)
BUN SERPL-MCNC: 15 MG/DL (ref 6–24)
BUN/CREAT SERPL: 12 (ref 9–20)
CALCIUM SERPL-MCNC: 8.8 MG/DL (ref 8.7–10.2)
CHLORIDE SERPL-SCNC: 100 MMOL/L (ref 96–106)
CHOLEST SERPL-MCNC: 149 MG/DL (ref 100–199)
CO2 SERPL-SCNC: 23 MMOL/L (ref 20–29)
CREAT SERPL-MCNC: 1.21 MG/DL (ref 0.76–1.27)
EGFRCR SERPLBLD CKD-EPI 2021: 69 ML/MIN/1.73
EOSINOPHIL # BLD AUTO: 0.4 X10E3/UL (ref 0–0.4)
EOSINOPHIL NFR BLD AUTO: 5 %
ERYTHROCYTE [DISTWIDTH] IN BLOOD BY AUTOMATED COUNT: 13 % (ref 11.6–15.4)
GLOBULIN SER CALC-MCNC: 3 G/DL (ref 1.5–4.5)
GLUCOSE SERPL-MCNC: 101 MG/DL (ref 70–99)
HCT VFR BLD AUTO: 48.7 % (ref 37.5–51)
HCV IGG SERPL QL IA: NON REACTIVE
HDLC SERPL-MCNC: 32 MG/DL
HGB BLD-MCNC: 16.7 G/DL (ref 13–17.7)
IMM GRANULOCYTES # BLD AUTO: 0 X10E3/UL (ref 0–0.1)
IMM GRANULOCYTES NFR BLD AUTO: 0 %
LDLC SERPL CALC-MCNC: 89 MG/DL (ref 0–99)
LYMPHOCYTES # BLD AUTO: 1.2 X10E3/UL (ref 0.7–3.1)
LYMPHOCYTES NFR BLD AUTO: 15 %
MCH RBC QN AUTO: 32.7 PG (ref 26.6–33)
MCHC RBC AUTO-ENTMCNC: 34.3 G/DL (ref 31.5–35.7)
MCV RBC AUTO: 95 FL (ref 79–97)
MONOCYTES # BLD AUTO: 0.9 X10E3/UL (ref 0.1–0.9)
MONOCYTES NFR BLD AUTO: 11 %
NEUTROPHILS # BLD AUTO: 5.2 X10E3/UL (ref 1.4–7)
NEUTROPHILS NFR BLD AUTO: 68 %
PLATELET # BLD AUTO: 199 X10E3/UL (ref 150–450)
POTASSIUM SERPL-SCNC: 4.5 MMOL/L (ref 3.5–5.2)
PROT SERPL-MCNC: 6.9 G/DL (ref 6–8.5)
PSA SERPL-MCNC: <0.1 NG/ML (ref 0–4)
RBC # BLD AUTO: 5.11 X10E6/UL (ref 4.14–5.8)
SODIUM SERPL-SCNC: 138 MMOL/L (ref 134–144)
TESTOST SERPL-MCNC: 468 NG/DL (ref 264–916)
TRIGL SERPL-MCNC: 162 MG/DL (ref 0–149)
VLDLC SERPL CALC-MCNC: 28 MG/DL (ref 5–40)
WBC # BLD AUTO: 7.7 X10E3/UL (ref 3.4–10.8)

## 2023-09-22 NOTE — PROGRESS NOTES
Labs look good.  Please advise that triglycerides are still very slightly elevated, however improved since checked 10 months ago, advised that like him to continue on his cholesterol medicine as well as his omega-3 fish oils this will be very helpful.  PSA also looks perfect.

## 2023-09-28 ENCOUNTER — OFFICE VISIT (OUTPATIENT)
Dept: UROLOGY | Facility: CLINIC | Age: 58
End: 2023-09-28
Payer: COMMERCIAL

## 2023-09-28 VITALS — WEIGHT: 246 LBS | HEIGHT: 72 IN | BODY MASS INDEX: 33.32 KG/M2 | TEMPERATURE: 97.2 F

## 2023-09-28 DIAGNOSIS — N52.31 ERECTILE DYSFUNCTION AFTER RADICAL PROSTATECTOMY: ICD-10-CM

## 2023-09-28 DIAGNOSIS — Z85.46 HISTORY OF PROSTATE CANCER: Primary | ICD-10-CM

## 2023-09-28 LAB
BILIRUB BLD-MCNC: NEGATIVE MG/DL
CLARITY, POC: CLEAR
COLOR UR: YELLOW
GLUCOSE UR STRIP-MCNC: NEGATIVE MG/DL
KETONES UR QL: NEGATIVE
LEUKOCYTE EST, POC: NEGATIVE
NITRITE UR-MCNC: NEGATIVE MG/ML
PH UR: 5.5 [PH] (ref 5–8)
PROT UR STRIP-MCNC: NEGATIVE MG/DL
RBC # UR STRIP: ABNORMAL /UL
SP GR UR: 1 (ref 1–1.03)
UROBILINOGEN UR QL: NORMAL

## 2023-09-28 RX ORDER — SILDENAFIL 100 MG/1
100 TABLET, FILM COATED ORAL DAILY PRN
Qty: 10 TABLET | Refills: 11 | Status: SHIPPED | OUTPATIENT
Start: 2023-09-28 | End: 2023-09-28 | Stop reason: SDUPTHER

## 2023-09-28 RX ORDER — SILDENAFIL 100 MG/1
100 TABLET, FILM COATED ORAL DAILY PRN
Qty: 10 TABLET | Refills: 11 | Status: SHIPPED | OUTPATIENT
Start: 2023-09-28

## 2024-02-15 ENCOUNTER — TELEPHONE (OUTPATIENT)
Dept: INTERNAL MEDICINE | Facility: CLINIC | Age: 59
End: 2024-02-15
Payer: COMMERCIAL

## 2024-04-05 NOTE — TELEPHONE ENCOUNTER
Attempted to call patient. No answer. Voicemail box full. Called patient to ask about status of cologuard test. Has it been completed and mailed off yet?    Hub okay to read.

## 2024-04-16 RX ORDER — SIMVASTATIN 10 MG
10 TABLET ORAL NIGHTLY
Qty: 90 TABLET | Refills: 3 | Status: SHIPPED | OUTPATIENT
Start: 2024-04-16

## 2024-04-16 NOTE — TELEPHONE ENCOUNTER
Rx Refill Note  Requested Prescriptions     Pending Prescriptions Disp Refills    simvastatin (Zocor) 10 MG tablet 90 tablet 3     Sig: Take 1 tablet by mouth Every Night.      Last office visit with prescribing clinician: 9/21/2023   Last telemedicine visit with prescribing clinician: Visit date not found   Next office visit with prescribing clinician: 9/24/2024                         Would you like a call back once the refill request has been completed: [] Yes [] No    If the office needs to give you a call back, can they leave a voicemail: [] Yes [] No    Gail Chandler MA  04/16/24, 10:53 CDT

## 2024-04-22 ENCOUNTER — OFFICE VISIT (OUTPATIENT)
Dept: INTERNAL MEDICINE | Facility: CLINIC | Age: 59
End: 2024-04-22
Payer: COMMERCIAL

## 2024-04-22 VITALS
TEMPERATURE: 98.2 F | DIASTOLIC BLOOD PRESSURE: 79 MMHG | WEIGHT: 240 LBS | OXYGEN SATURATION: 97 % | HEIGHT: 72 IN | BODY MASS INDEX: 32.51 KG/M2 | SYSTOLIC BLOOD PRESSURE: 167 MMHG | HEART RATE: 66 BPM

## 2024-04-22 DIAGNOSIS — B02.9 HERPES ZOSTER WITHOUT COMPLICATION: Primary | ICD-10-CM

## 2024-04-22 PROBLEM — Q37.9: Status: ACTIVE | Noted: 2017-01-20

## 2024-04-22 PROBLEM — H90.5: Status: ACTIVE | Noted: 2017-01-20

## 2024-04-22 PROBLEM — D17.79: Status: ACTIVE | Noted: 2017-01-20

## 2024-04-22 PROBLEM — Q87.89: Status: ACTIVE | Noted: 2017-01-20

## 2024-04-22 PROBLEM — M72.0 DUPUYTREN'S DISEASE: Status: ACTIVE | Noted: 2024-04-22

## 2024-04-22 PROCEDURE — 99213 OFFICE O/P EST LOW 20 MIN: CPT | Performed by: FAMILY MEDICINE

## 2024-04-22 RX ORDER — VALACYCLOVIR HYDROCHLORIDE 1 G/1
1000 TABLET, FILM COATED ORAL 2 TIMES DAILY
Qty: 60 TABLET | Refills: 1 | Status: SHIPPED | OUTPATIENT
Start: 2024-04-22

## 2024-04-22 NOTE — PROGRESS NOTES
Subjective     Chief Complaint   Patient presents with    Herpes Zoster       Herpes Zoster        Caden Hendrix is a 58 y.o. male who presents for a routine visit at this time. who presents for evaluation of a rash involving the lumbar region. Rash started 3 days ago. Lesions are pink, and raised in texture. Rash has changed over time. Rash is pruritic. Associated symptoms: none. Patient denies: crankiness and decrease in appetite. Patient has not had contacts with similar rash. Patient has not had new exposures (soaps, lotions, laundry detergents, foods, medications, plants, insects or animals).  The rash is in a singular dermatome so I believe this to be related to shingles we will go ahead and start him on some antiviral therapy and see if he improves is going to use calamine lotion or Benadryl cream over the affected area.    Patient's PMR from outside medical facility reviewed and noted.      Past Medical History:   Past Medical History:   Diagnosis Date    Hypertension     Prostate CA      Past Surgical History:  Past Surgical History:   Procedure Laterality Date    HAND SURGERY      KNEE SURGERY      PROSTATECTOMY N/A 11/14/2018    Procedure: PROSTATECTOMY LAPAROSCOPIC WITH DAVINCI SI ROBOT, BILATERAL PELVIC LYMPH NODE DISSECTION;  Surgeon: Manfred Vargas MD;  [facility]: [facility];  Service: DaVinci    SHOULDER SURGERY       Social History:  reports that he has never smoked. His smokeless tobacco use includes snuff. He reports that he does not drink alcohol and does not use drugs.    Family History: family history includes Heart disease in his father; No Known Problems in his mother.      Allergies:  No Known Allergies  Medications:  Prior to Admission medications    Medication Sig Start Date End Date Taking? Authorizing Provider   metoprolol tartrate (LOPRESSOR) 50 MG tablet Take 1 tablet by mouth 2 (Two) Times a Day. 9/21/23  Yes Margaret Gamboa APRN   Omega-3 Fatty Acids (fish oil) 1000 MG  capsule capsule Take 3 capsules by mouth Daily With Breakfast. 8/5/22  Yes Margaret Gamboa APRN   sildenafil (VIAGRA) 100 MG tablet Take 1 tablet by mouth Daily As Needed for Erectile Dysfunction. 9/28/23  Yes Simon Guerra MD   simvastatin (Zocor) 10 MG tablet Take 1 tablet by mouth Every Night. 4/16/24  Yes Margaret Gamboa APRN   vitamin D (ERGOCALCIFEROL) 1.25 MG (08175 UT) capsule capsule Take 1 capsule by mouth 1 (One) Time Per Week. 9/21/23  Yes Margaret Gamboa APRN       NERI: Over the last two weeks, how often have you been bothered by the following problems?  Feeling nervous, anxious or on edge: Not at all  Not being able to stop or control worrying: Not at all  Worrying too much about different things: Not at all  Trouble Relaxing: Not at all  Being so restless that it is hard to sit still: Not at all  Becoming easily annoyed or irritable: Not at all  Feeling afraid as if something awful might happen: Not at all  NERI 7 Total Score: 0  If you checked any problems, how difficult have these problems made it for you to do your work, take care of things at home, or get along with other people: Not difficult at all      PHQ-9 Depression Screening  Little interest or pleasure in doing things? 0-->not at all   Feeling down, depressed, or hopeless? 0-->not at all   Trouble falling or staying asleep, or sleeping too much?     Feeling tired or having little energy?     Poor appetite or overeating?     Feeling bad about yourself - or that you are a failure or have let yourself or your family down?     Trouble concentrating on things, such as reading the newspaper or watching television?     Moving or speaking so slowly that other people could have noticed? Or the opposite - being so fidgety or restless that you have been moving around a lot more than usual?     Thoughts that you would be better off dead, or of hurting yourself in some way?     PHQ-9 Total Score 0   If you checked off any problems, how  "difficult have these problems made it for you to do your work, take care of things at home, or get along with other people?         PHQ-9 Total Score: 0   0 (Negative screening for depression)      Review of systems   negative unless otherwise specified above in HPI    Objective     Vital Signs: /79 (BP Location: Left arm, Patient Position: Sitting, Cuff Size: Adult)   Pulse 66   Temp 98.2 °F (36.8 °C) (Infrared)   Ht 182.9 cm (72\")   Wt 109 kg (240 lb)   SpO2 97%   BMI 32.55 kg/m²     Physical Exam  Vitals and nursing note reviewed.   Constitutional:       General: He is not in acute distress.     Appearance: Normal appearance.   HENT:      Head: Normocephalic.   Eyes:      Extraocular Movements: Extraocular movements intact.      Pupils: Pupils are equal, round, and reactive to light.   Cardiovascular:      Rate and Rhythm: Normal rate and regular rhythm.      Heart sounds: Normal heart sounds. No murmur heard.  Pulmonary:      Effort: Pulmonary effort is normal. No respiratory distress.      Breath sounds: Normal breath sounds. No rhonchi or rales.   Abdominal:      General: Abdomen is flat. Bowel sounds are normal.      Palpations: Abdomen is soft.   Skin:     Findings: Rash present.   Neurological:      General: No focal deficit present.      Mental Status: He is alert.                    Results Reviewed:  Glucose   Date Value Ref Range Status   09/21/2023 101 (H) 70 - 99 mg/dL Final   11/15/2018 111 (H) 70 - 100 mg/dL Final     BUN   Date Value Ref Range Status   09/21/2023 15 6 - 24 mg/dL Final   11/15/2018 14 5 - 21 mg/dL Final     Creatinine   Date Value Ref Range Status   09/21/2023 1.21 0.76 - 1.27 mg/dL Final   11/15/2018 1.13 0.50 - 1.40 mg/dL Final   10/09/2018 1.20 0.60 - 1.30 mg/dL Final     Comment:     Serial Number: [id number]:  [id number]     Sodium   Date Value Ref Range Status   09/21/2023 138 134 - 144 mmol/L Final   11/15/2018 138 135 - 145 mmol/L Final     Potassium   Date Value " Ref Range Status   09/21/2023 4.5 3.5 - 5.2 mmol/L Final   11/15/2018 4.4 3.5 - 5.3 mmol/L Final     Chloride   Date Value Ref Range Status   09/21/2023 100 96 - 106 mmol/L Final   11/15/2018 101 98 - 110 mmol/L Final     CO2   Date Value Ref Range Status   11/15/2018 29.0 24.0 - 31.0 mmol/L Final     Total CO2   Date Value Ref Range Status   09/21/2023 23 20 - 29 mmol/L Final     Calcium   Date Value Ref Range Status   09/21/2023 8.8 8.7 - 10.2 mg/dL Final   11/15/2018 8.0 (L) 8.4 - 10.4 mg/dL Final     ALT (SGPT)   Date Value Ref Range Status   09/21/2023 42 0 - 44 IU/L Final     AST (SGOT)   Date Value Ref Range Status   09/21/2023 31 0 - 40 IU/L Final     WBC   Date Value Ref Range Status   09/21/2023 7.7 3.4 - 10.8 x10E3/uL Final     Hematocrit   Date Value Ref Range Status   09/21/2023 48.7 37.5 - 51.0 % Final   11/15/2018 38.3 (L) 40.0 - 52.0 % Final     Platelets   Date Value Ref Range Status   09/21/2023 199 150 - 450 x10E3/uL Final   11/15/2018 183 130 - 400 10*3/mm3 Final     Triglycerides   Date Value Ref Range Status   09/21/2023 162 (H) 0 - 149 mg/dL Final     HDL Cholesterol   Date Value Ref Range Status   09/21/2023 32 (L) >39 mg/dL Final     LDL Chol Calc (NIH)   Date Value Ref Range Status   09/21/2023 89 0 - 99 mg/dL Final                 Assessment / Plan     Assessment/Plan:   Diagnosis Plan   1. Herpes zoster without complication  valACYclovir (Valtrex) 1000 MG tablet            No follow-ups on file. unless patient needs to be seen sooner or acute issues arise.      I have discussed the patient results/orders and and plan/recommendation with them at today's visit.      Signed by:    Rohit Martinez MD Date: 04/22/24

## 2024-08-01 DIAGNOSIS — E55.9 HYPOVITAMINOSIS D: ICD-10-CM

## 2024-08-01 RX ORDER — ERGOCALCIFEROL 1.25 MG/1
50000 CAPSULE ORAL WEEKLY
Qty: 10 CAPSULE | Refills: 3 | Status: SHIPPED | OUTPATIENT
Start: 2024-08-01

## 2024-08-01 NOTE — TELEPHONE ENCOUNTER
Rx Refill Note  Requested Prescriptions     Pending Prescriptions Disp Refills    vitamin D (ERGOCALCIFEROL) 1.25 MG (50519 UT) capsule capsule 10 capsule 3     Sig: Take 1 capsule by mouth 1 (One) Time Per Week.      Last office visit with prescribing clinician: 9/21/2023   Last telemedicine visit with prescribing clinician: Visit date not found   Next office visit with prescribing clinician: 9/24/2024       Vitamin D,25-Hydroxy (09/21/2023 07:47)                         Would you like a call back once the refill request has been completed: [] Yes [] No    If the office needs to give you a call back, can they leave a voicemail: [] Yes [] No    Keyla Allison MA  08/01/24, 09:22 CDT

## 2024-09-20 DIAGNOSIS — I10 PRIMARY HYPERTENSION: ICD-10-CM

## 2024-09-20 RX ORDER — METOPROLOL TARTRATE 50 MG
50 TABLET ORAL 2 TIMES DAILY
Qty: 240 TABLET | Refills: 4 | Status: SHIPPED | OUTPATIENT
Start: 2024-09-20

## 2024-09-23 ENCOUNTER — TELEPHONE (OUTPATIENT)
Dept: UROLOGY | Facility: CLINIC | Age: 59
End: 2024-09-23
Payer: COMMERCIAL

## 2024-09-24 ENCOUNTER — OFFICE VISIT (OUTPATIENT)
Dept: INTERNAL MEDICINE | Facility: CLINIC | Age: 59
End: 2024-09-24
Payer: COMMERCIAL

## 2024-09-24 VITALS
WEIGHT: 251.2 LBS | TEMPERATURE: 98.4 F | HEIGHT: 72 IN | HEART RATE: 60 BPM | OXYGEN SATURATION: 97 % | SYSTOLIC BLOOD PRESSURE: 136 MMHG | RESPIRATION RATE: 17 BRPM | DIASTOLIC BLOOD PRESSURE: 88 MMHG | BODY MASS INDEX: 34.02 KG/M2

## 2024-09-24 DIAGNOSIS — R06.02 SHORTNESS OF BREATH: ICD-10-CM

## 2024-09-24 DIAGNOSIS — I10 PRIMARY HYPERTENSION: ICD-10-CM

## 2024-09-24 DIAGNOSIS — E55.9 HYPOVITAMINOSIS D: ICD-10-CM

## 2024-09-24 DIAGNOSIS — E53.8 VITAMIN B12 DEFICIENCY: ICD-10-CM

## 2024-09-24 DIAGNOSIS — Z00.00 ROUTINE GENERAL MEDICAL EXAMINATION AT A HEALTH CARE FACILITY: Primary | ICD-10-CM

## 2024-09-24 DIAGNOSIS — Z12.5 SCREENING FOR MALIGNANT NEOPLASM OF PROSTATE: ICD-10-CM

## 2024-09-24 DIAGNOSIS — Z12.11 SCREENING FOR MALIGNANT NEOPLASM OF COLON: ICD-10-CM

## 2024-09-24 PROCEDURE — 99396 PREV VISIT EST AGE 40-64: CPT

## 2024-09-25 LAB
25(OH)D3+25(OH)D2 SERPL-MCNC: 31.1 NG/ML (ref 30–100)
ALBUMIN SERPL-MCNC: 4.1 G/DL (ref 3.8–4.9)
ALP SERPL-CCNC: 78 IU/L (ref 44–121)
ALT SERPL-CCNC: 43 IU/L (ref 0–44)
AST SERPL-CCNC: 27 IU/L (ref 0–40)
BASOPHILS # BLD AUTO: 0.1 X10E3/UL (ref 0–0.2)
BASOPHILS NFR BLD AUTO: 1 %
BILIRUB SERPL-MCNC: 0.4 MG/DL (ref 0–1.2)
BUN SERPL-MCNC: 12 MG/DL (ref 8–27)
BUN/CREAT SERPL: 11 (ref 10–24)
CALCIUM SERPL-MCNC: 9 MG/DL (ref 8.6–10.2)
CHLORIDE SERPL-SCNC: 105 MMOL/L (ref 96–106)
CHOLEST SERPL-MCNC: 166 MG/DL (ref 100–199)
CO2 SERPL-SCNC: 20 MMOL/L (ref 20–29)
CREAT SERPL-MCNC: 1.05 MG/DL (ref 0.76–1.27)
EGFRCR SERPLBLD CKD-EPI 2021: 81 ML/MIN/1.73
EOSINOPHIL # BLD AUTO: 0.7 X10E3/UL (ref 0–0.4)
EOSINOPHIL NFR BLD AUTO: 9 %
ERYTHROCYTE [DISTWIDTH] IN BLOOD BY AUTOMATED COUNT: 13.3 % (ref 11.6–15.4)
GLOBULIN SER CALC-MCNC: 2.7 G/DL (ref 1.5–4.5)
GLUCOSE SERPL-MCNC: 116 MG/DL (ref 70–99)
HBA1C MFR BLD: 5.3 % (ref 4.8–5.6)
HCT VFR BLD AUTO: 49 % (ref 37.5–51)
HDLC SERPL-MCNC: 31 MG/DL
HGB BLD-MCNC: 16.6 G/DL (ref 13–17.7)
IMM GRANULOCYTES # BLD AUTO: 0 X10E3/UL (ref 0–0.1)
IMM GRANULOCYTES NFR BLD AUTO: 0 %
LDLC SERPL CALC-MCNC: 81 MG/DL (ref 0–99)
LYMPHOCYTES # BLD AUTO: 2 X10E3/UL (ref 0.7–3.1)
LYMPHOCYTES NFR BLD AUTO: 24 %
MCH RBC QN AUTO: 33.2 PG (ref 26.6–33)
MCHC RBC AUTO-ENTMCNC: 33.9 G/DL (ref 31.5–35.7)
MCV RBC AUTO: 98 FL (ref 79–97)
MONOCYTES # BLD AUTO: 0.6 X10E3/UL (ref 0.1–0.9)
MONOCYTES NFR BLD AUTO: 7 %
NEUTROPHILS # BLD AUTO: 4.8 X10E3/UL (ref 1.4–7)
NEUTROPHILS NFR BLD AUTO: 59 %
PLATELET # BLD AUTO: 232 X10E3/UL (ref 150–450)
POTASSIUM SERPL-SCNC: 4.6 MMOL/L (ref 3.5–5.2)
PROT SERPL-MCNC: 6.8 G/DL (ref 6–8.5)
PSA SERPL-MCNC: <0.1 NG/ML (ref 0–4)
RBC # BLD AUTO: 5 X10E6/UL (ref 4.14–5.8)
SODIUM SERPL-SCNC: 140 MMOL/L (ref 134–144)
T4 FREE SERPL-MCNC: 0.99 NG/DL (ref 0.82–1.77)
TRIGL SERPL-MCNC: 330 MG/DL (ref 0–149)
TSH SERPL DL<=0.005 MIU/L-ACNC: 1.77 UIU/ML (ref 0.45–4.5)
VIT B12 SERPL-MCNC: 344 PG/ML (ref 232–1245)
VLDLC SERPL CALC-MCNC: 54 MG/DL (ref 5–40)
WBC # BLD AUTO: 8.2 X10E3/UL (ref 3.4–10.8)

## 2024-09-26 ENCOUNTER — TELEPHONE (OUTPATIENT)
Dept: INTERNAL MEDICINE | Facility: CLINIC | Age: 59
End: 2024-09-26
Payer: COMMERCIAL

## 2024-09-26 ENCOUNTER — OFFICE VISIT (OUTPATIENT)
Dept: UROLOGY | Facility: CLINIC | Age: 59
End: 2024-09-26
Payer: COMMERCIAL

## 2024-09-26 VITALS — BODY MASS INDEX: 32.78 KG/M2 | HEIGHT: 72 IN | TEMPERATURE: 97.6 F | WEIGHT: 242 LBS

## 2024-09-26 DIAGNOSIS — Z85.46 HISTORY OF PROSTATE CANCER: Primary | ICD-10-CM

## 2024-09-26 DIAGNOSIS — N52.31 ERECTILE DYSFUNCTION AFTER RADICAL PROSTATECTOMY: ICD-10-CM

## 2024-09-26 LAB
BILIRUB BLD-MCNC: ABNORMAL MG/DL
CLARITY, POC: CLEAR
COLOR UR: YELLOW
GLUCOSE UR STRIP-MCNC: ABNORMAL MG/DL
KETONES UR QL: NEGATIVE
LEUKOCYTE EST, POC: NEGATIVE
NITRITE UR-MCNC: NEGATIVE MG/ML
PH UR: 5.5 [PH] (ref 5–8)
PROT UR STRIP-MCNC: ABNORMAL MG/DL
RBC # UR STRIP: ABNORMAL /UL
SP GR UR: 1.03 (ref 1–1.03)
UROBILINOGEN UR QL: NORMAL

## 2024-09-26 RX ORDER — SILDENAFIL 100 MG/1
100 TABLET, FILM COATED ORAL DAILY PRN
Qty: 10 TABLET | Refills: 11 | Status: SHIPPED | OUTPATIENT
Start: 2024-09-26

## 2024-09-26 NOTE — TELEPHONE ENCOUNTER
You may need to ask Lisha, but I don't think he will be able to do both office visits in the same day due to a billing issue. As far as labs, that shouldn't be a problem.

## 2024-09-26 NOTE — TELEPHONE ENCOUNTER
Pt saw Dr Guerra today and he told pt to schedule his Annual next year with him, same day as he see's Margaret.  He has a LAB appt on the Monday prior his OV to have PSA and he wanted to know if Margaret would add Lab orders so he can do all at the same time, then they he can review everything with her at time of visit 9/25/25.

## 2024-09-30 NOTE — TELEPHONE ENCOUNTER
I am sending to staff as an informative message from Lisha.  Please read the following.  Pt's can be seen as different specialty & different job titles.

## 2024-10-15 ENCOUNTER — TELEPHONE (OUTPATIENT)
Dept: ULTRASOUND IMAGING | Facility: CLINIC | Age: 59
End: 2024-10-15
Payer: COMMERCIAL

## 2024-10-15 NOTE — TELEPHONE ENCOUNTER
ANA OKAY TO RELAY.    CALLED PATIENT TO LET HIM KNOW ABOUT APPOINTMENT AT [facility] .    THIS FOR THE PULMONARY FUNCTION TEST  10/22 @3:00 AT [facility] SCHEDULING PHONE # [phone]

## 2024-10-23 ENCOUNTER — TELEPHONE (OUTPATIENT)
Dept: INTERNAL MEDICINE | Facility: CLINIC | Age: 59
End: 2024-10-23
Payer: COMMERCIAL

## 2024-10-23 NOTE — TELEPHONE ENCOUNTER
WILLY - [facility] - CARDIOLOGY - [phone]    PATIENT NO SHOWED FOR HIS PRE & POST PFT TEST - WILLY LEFT VOICEMAIL TO RESCHEDULE, BUT IF PATIENT DOES NOT CALL BACK, SHE WILL NEED A NEW REFERRAL

## 2024-10-28 DIAGNOSIS — E55.9 HYPOVITAMINOSIS D: ICD-10-CM

## 2024-10-28 DIAGNOSIS — I10 PRIMARY HYPERTENSION: ICD-10-CM

## 2024-10-28 DIAGNOSIS — N52.31 ERECTILE DYSFUNCTION AFTER RADICAL PROSTATECTOMY: ICD-10-CM

## 2024-10-28 DIAGNOSIS — E78.2 MIXED HYPERLIPIDEMIA: ICD-10-CM

## 2024-10-28 DIAGNOSIS — B02.9 HERPES ZOSTER WITHOUT COMPLICATION: ICD-10-CM

## 2024-10-28 NOTE — TELEPHONE ENCOUNTER
"    Caller: Caden Hendrix \"Bull\"    Relationship: Self    Best call back number: [phone]    Requested Prescriptions:   Requested Prescriptions     Pending Prescriptions Disp Refills    metoprolol tartrate (LOPRESSOR) 50 MG tablet 240 tablet 4     Sig: Take 1 tablet by mouth 2 (Two) Times a Day.    vitamin D (ERGOCALCIFEROL) 1.25 MG (59574 UT) capsule capsule 10 capsule 3     Sig: Take 1 capsule by mouth 1 (One) Time Per Week.    Omega-3 Fatty Acids (fish oil) 1000 MG capsule capsule 90 capsule 1     Sig: Take 3 capsules by mouth Daily With Breakfast.    sildenafil (VIAGRA) 100 MG tablet 10 tablet 11     Sig: Take 1 tablet by mouth Daily As Needed for Erectile Dysfunction.    simvastatin (Zocor) 10 MG tablet 90 tablet 3     Sig: Take 1 tablet by mouth Every Night.    valACYclovir (Valtrex) 1000 MG tablet 60 tablet 1     Sig: Take 1 tablet by mouth 2 (Two) Times a Day.        Pharmacy where request should be sent: [facility] #[id number] [address] AT [address] - [phone] [facility] [phone]      Last office visit with prescribing clinician: 9/24/2024   Last telemedicine visit with prescribing clinician: Visit date not found   Next office visit with prescribing clinician: 9/25/2025     Does the patient have less than a 3 day supply:  [x] Yes  [] No    Would you like a call back once the refill request has been completed: [x] Yes [] No    If the office needs to give you a call back, can they leave a voicemail: [] Yes [] No    Marlene Rm Rep   10/28/24 15:53 CDT       "

## 2024-10-29 RX ORDER — CHLORAL HYDRATE 500 MG
2000 CAPSULE ORAL
Qty: 90 CAPSULE | Refills: 3 | Status: SHIPPED | OUTPATIENT
Start: 2024-10-29

## 2024-10-29 RX ORDER — ERGOCALCIFEROL 1.25 MG/1
50000 CAPSULE, LIQUID FILLED ORAL WEEKLY
Qty: 10 CAPSULE | Refills: 3 | Status: SHIPPED | OUTPATIENT
Start: 2024-10-29

## 2024-10-29 RX ORDER — VALACYCLOVIR HYDROCHLORIDE 1 G/1
1000 TABLET, FILM COATED ORAL 2 TIMES DAILY
Qty: 60 TABLET | Refills: 3 | Status: SHIPPED | OUTPATIENT
Start: 2024-10-29

## 2024-10-29 RX ORDER — SIMVASTATIN 10 MG
10 TABLET ORAL NIGHTLY
Qty: 90 TABLET | Refills: 3 | Status: SHIPPED | OUTPATIENT
Start: 2024-10-29

## 2024-10-29 RX ORDER — SILDENAFIL 100 MG/1
100 TABLET, FILM COATED ORAL DAILY PRN
Qty: 10 TABLET | Refills: 11 | Status: SHIPPED | OUTPATIENT
Start: 2024-10-29

## 2024-10-29 RX ORDER — METOPROLOL TARTRATE 50 MG
50 TABLET ORAL 2 TIMES DAILY
Qty: 240 TABLET | Refills: 4 | Status: SHIPPED | OUTPATIENT
Start: 2024-10-29

## 2024-10-29 NOTE — TELEPHONE ENCOUNTER
Rx Refill Note  Requested Prescriptions     Pending Prescriptions Disp Refills    metoprolol tartrate (LOPRESSOR) 50 MG tablet 240 tablet 4     Sig: Take 1 tablet by mouth 2 (Two) Times a Day.    vitamin D (ERGOCALCIFEROL) 1.25 MG (53159 UT) capsule capsule 10 capsule 3     Sig: Take 1 capsule by mouth 1 (One) Time Per Week.    Omega-3 Fatty Acids (fish oil) 1000 MG capsule capsule 90 capsule 1     Sig: Take 3 capsules by mouth Daily With Breakfast.    sildenafil (VIAGRA) 100 MG tablet 10 tablet 11     Sig: Take 1 tablet by mouth Daily As Needed for Erectile Dysfunction.    simvastatin (Zocor) 10 MG tablet 90 tablet 3     Sig: Take 1 tablet by mouth Every Night.    valACYclovir (Valtrex) 1000 MG tablet 60 tablet 1     Sig: Take 1 tablet by mouth 2 (Two) Times a Day.      Last office visit with prescribing clinician: 9/24/2024   Last telemedicine visit with prescribing clinician: Visit date not found   Next office visit with prescribing clinician: 9/25/2025                         Would you like a call back once the refill request has been completed: [] Yes [] No    If the office needs to give you a call back, can they leave a voicemail: [] Yes [] No    Tiarra Husain RN  10/29/24, 07:08 CDT

## 2025-08-26 DIAGNOSIS — E55.9 HYPOVITAMINOSIS D: ICD-10-CM

## 2025-08-26 RX ORDER — ERGOCALCIFEROL 1.25 MG/1
50000 CAPSULE, LIQUID FILLED ORAL WEEKLY
Qty: 10 CAPSULE | Refills: 3 | Status: SHIPPED | OUTPATIENT
Start: 2025-08-26 | End: 2025-08-29

## 2025-08-27 ENCOUNTER — TELEPHONE (OUTPATIENT)
Age: 60
End: 2025-08-27
Payer: COMMERCIAL

## 2025-08-27 DIAGNOSIS — E55.9 HYPOVITAMINOSIS D: ICD-10-CM

## 2025-08-29 RX ORDER — ERGOCALCIFEROL 1.25 MG/1
50000 CAPSULE, LIQUID FILLED ORAL WEEKLY
Qty: 4 CAPSULE | Refills: 3 | Status: SHIPPED | OUTPATIENT
Start: 2025-08-29

## (undated) DEVICE — SKIN AFFIX SURG ADHESIVE 72/CS 0.55ML: Brand: MEDLINE

## (undated) DEVICE — GLV SURG SENSICARE W/ALOE PF LF 7 STRL

## (undated) DEVICE — ENDOPOUCH RETRIEVER SPECIMEN RETRIEVAL BAGS: Brand: ENDOPOUCH RETRIEVER

## (undated) DEVICE — RESERVOIR,SUCTION,100CC,SILICONE: Brand: MEDLINE

## (undated) DEVICE — GLV SURG TRIUMPH MICRO PF LTX 7 STRL

## (undated) DEVICE — VIOLET POLYDIOXANONE POLYMER, SYNTHETIC ABSORBABLE SUTURE CLIPS: Brand: LAPRA-TY

## (undated) DEVICE — CLTH CLENS READYCLEANSE PERI CARE PK/5

## (undated) DEVICE — SYS CLOSE PORTII CARTR/THOMASN XL

## (undated) DEVICE — ST TBG AIRSEAL FLTR TRI LUM

## (undated) DEVICE — VESSEL SEALER: Brand: ENDOWRIST;DAVINCI SI

## (undated) DEVICE — SUT VIC 3/0 RB1 27IN UD VCP215H

## (undated) DEVICE — OBT BLADLES ENDOWRIST DAVINCI/S 8MM

## (undated) DEVICE — TIP COVER ACCESSORY

## (undated) DEVICE — Device

## (undated) DEVICE — TROC ANCHORPORT BLADELES W/GRIP 12X120MM

## (undated) DEVICE — DAVINCI: Brand: MEDLINE INDUSTRIES, INC.

## (undated) DEVICE — DISPOSABLE BIPOLAR CABLE 12FT. (3.6M): Brand: KIRWAN

## (undated) DEVICE — SUT VIC 1 SUTUPAK TIES 18IN J913G

## (undated) DEVICE — APPL HEMO SURG ARISTA/AH/FLEXITIP XL 38CM

## (undated) DEVICE — ENDOPATH XCEL WITH OPTIVIEW TECHNOLOGY BLADELESS TROCARS WITH STABILITY SLEEVES: Brand: ENDOPATH XCEL OPTIVIEW

## (undated) DEVICE — SUT VIC 0 UR6 27IN VCP603H

## (undated) DEVICE — ANTIBACTERIAL UNDYED BRAIDED (POLYGLACTIN 910), SYNTHETIC ABSORBABLE SUTURE: Brand: COATED VICRYL

## (undated) DEVICE — SUT SILK 2/0 FS BLK 18IN 685G

## (undated) DEVICE — PK TURNOVER RM ADV

## (undated) DEVICE — CATHETER,FOLEY,SILI-ELAST,LTX,20FR,10ML: Brand: MEDLINE

## (undated) DEVICE — GLV SURG BIOGEL M LTX PF 7 1/2

## (undated) DEVICE — DRN JP RND NO TROC SIL 15F 3/16IN

## (undated) DEVICE — SUT MNCRYL 4/0 PS2 27IN UD MCP426H

## (undated) DEVICE — SUT VIC 0 CT1 CR8 27IN UD VCPP41D

## (undated) DEVICE — GLV SURG SENSICARE W/ALOE PF LF SZ6 STRL